# Patient Record
Sex: FEMALE | Race: WHITE | Employment: UNEMPLOYED | URBAN - METROPOLITAN AREA
[De-identification: names, ages, dates, MRNs, and addresses within clinical notes are randomized per-mention and may not be internally consistent; named-entity substitution may affect disease eponyms.]

---

## 2017-01-25 NOTE — TELEPHONE ENCOUNTER
Please advise on Amitriptylline Rx refill. Thank you.  LR 2/22/16 LOV 6/27/16    Rx pended as previously prescribed #90 with 3 refills for MD approval.

## 2017-01-25 NOTE — TELEPHONE ENCOUNTER
From: Sulma Borrego  To:  Gil Leach DO  Sent: 1/22/2017 2:28 PM CST  Subject: Medication Renewal Request    Original authorizing provider: DO Sulma Castro would like a refill of the following medications:  Amitriptyline HCl (INDERJIT

## 2017-01-27 RX ORDER — AMITRIPTYLINE HYDROCHLORIDE 50 MG/1
50 TABLET, FILM COATED ORAL NIGHTLY
Qty: 90 TABLET | Refills: 0 | Status: SHIPPED | OUTPATIENT
Start: 2017-01-27 | End: 2017-03-14

## 2017-02-21 ENCOUNTER — OFFICE VISIT (OUTPATIENT)
Dept: OTOLARYNGOLOGY | Facility: CLINIC | Age: 52
End: 2017-02-21

## 2017-02-21 VITALS
BODY MASS INDEX: 26.68 KG/M2 | DIASTOLIC BLOOD PRESSURE: 81 MMHG | TEMPERATURE: 99 F | WEIGHT: 170 LBS | RESPIRATION RATE: 18 BRPM | HEIGHT: 67 IN | SYSTOLIC BLOOD PRESSURE: 114 MMHG | HEART RATE: 92 BPM

## 2017-02-21 DIAGNOSIS — H72.91 PERFORATED TYMPANIC MEMBRANE ON EXAMINATION, RIGHT: Primary | ICD-10-CM

## 2017-02-21 DIAGNOSIS — H92.11 OTORRHEA OF RIGHT EAR: ICD-10-CM

## 2017-02-21 PROCEDURE — 99213 OFFICE O/P EST LOW 20 MIN: CPT | Performed by: OTOLARYNGOLOGY

## 2017-02-21 PROCEDURE — 99212 OFFICE O/P EST SF 10 MIN: CPT | Performed by: OTOLARYNGOLOGY

## 2017-02-21 PROCEDURE — 92504 EAR MICROSCOPY EXAMINATION: CPT | Performed by: OTOLARYNGOLOGY

## 2017-02-21 RX ORDER — CIPROFLOXACIN AND DEXAMETHASONE 3; 1 MG/ML; MG/ML
3 SUSPENSION/ DROPS AURICULAR (OTIC) EVERY 12 HOURS
Qty: 1 BOTTLE | Refills: 0 | Status: SHIPPED | OUTPATIENT
Start: 2017-02-21 | End: 2017-02-28

## 2017-02-21 NOTE — PROGRESS NOTES
Norberto Shea is a 46year old female. Patient presents with:  Cerumen Impaction: Patient c/o bilateral ear wax and requesting cleaning.  Pt c/o hearing loss to right ear and refusing hearing test.      HISTORY OF PRESENT ILLNESS    Known history of perfor Negative Tremors. Psych Negative Anxiety and depression. Integumentary Negative Frequent skin infections, pigment change and rash. Hema/Lymph Negative Easy bleeding and easy bruising. PHYSICAL EXAM    /81 mmHg  Pulse 92  Temp(Src) 98. Otic Suspension, Place 3 drops into the right ear every 12 (twelve) hours. , Disp: 1 Bottle, Rfl: 0  •  Amitriptyline HCl 50 MG Oral Tab, Take 1 tablet (50 mg total) by mouth nightly., Disp: 90 tablet, Rfl: 0  •  AmLODIPine Besylate (NORVASC) 5 MG Oral Tab,

## 2017-02-28 RX ORDER — AMLODIPINE BESYLATE 5 MG/1
5 TABLET ORAL DAILY
Qty: 90 TABLET | Refills: 3 | OUTPATIENT
Start: 2017-02-28

## 2017-02-28 NOTE — TELEPHONE ENCOUNTER
From: Yang Jaquez  To: Lili Atkins DO  Sent: 2/22/2017 3:39 PM CST  Subject: Medication Renewal Request    Original authorizing provider: DO Yang Jean would like a refill of the following medications:   AmLODIPine Besylate (N

## 2017-03-01 RX ORDER — AMLODIPINE BESYLATE 5 MG/1
5 TABLET ORAL DAILY
Qty: 90 TABLET | Refills: 0 | Status: SHIPPED | OUTPATIENT
Start: 2017-03-01 | End: 2017-03-14

## 2017-03-01 NOTE — TELEPHONE ENCOUNTER
Pt does not meet criteria as she has not had ov in the last 6 months or next 3 months. LOV 6/27/2016 with no RTC. Pt advised and scheduled 3/14. Meets criteria, sent 90/0.

## 2017-03-01 NOTE — TELEPHONE ENCOUNTER
Hypertensive Medications  Protocol Criteria:  · Appointment scheduled in the past 6 months or in the next 3 months  · BMP or CMP in the past 12 months  · Creatinine result < 2  Recent Visits       Provider Department Primary Dx    8 months ago Kaveh Hernandez

## 2017-03-14 ENCOUNTER — OFFICE VISIT (OUTPATIENT)
Dept: FAMILY MEDICINE CLINIC | Facility: CLINIC | Age: 52
End: 2017-03-14

## 2017-03-14 VITALS
SYSTOLIC BLOOD PRESSURE: 121 MMHG | DIASTOLIC BLOOD PRESSURE: 86 MMHG | HEART RATE: 81 BPM | WEIGHT: 175 LBS | BODY MASS INDEX: 27.47 KG/M2 | TEMPERATURE: 98 F | HEIGHT: 67 IN

## 2017-03-14 DIAGNOSIS — I10 ESSENTIAL HYPERTENSION: ICD-10-CM

## 2017-03-14 DIAGNOSIS — Z00.00 ROUTINE GENERAL MEDICAL EXAMINATION AT A HEALTH CARE FACILITY: Primary | ICD-10-CM

## 2017-03-14 PROCEDURE — 99396 PREV VISIT EST AGE 40-64: CPT | Performed by: FAMILY MEDICINE

## 2017-03-14 RX ORDER — AMITRIPTYLINE HYDROCHLORIDE 50 MG/1
50 TABLET, FILM COATED ORAL NIGHTLY
Qty: 90 TABLET | Refills: 3 | Status: SHIPPED | OUTPATIENT
Start: 2017-03-14 | End: 2018-04-09

## 2017-03-14 RX ORDER — METOPROLOL SUCCINATE 50 MG/1
50 TABLET, EXTENDED RELEASE ORAL DAILY
Qty: 90 TABLET | Refills: 3 | Status: SHIPPED | OUTPATIENT
Start: 2017-03-14 | End: 2018-04-09

## 2017-03-14 RX ORDER — AMLODIPINE BESYLATE 5 MG/1
5 TABLET ORAL DAILY
Qty: 90 TABLET | Refills: 3 | Status: SHIPPED | OUTPATIENT
Start: 2017-03-14 | End: 2018-04-09

## 2017-03-14 NOTE — PROGRESS NOTES
HPI:    Patient ID: Morro Benton is a 46year old female. HPI  Patient presents with:  Routine Physical    Review of Systems   Constitutional: Negative. HENT: Negative. Eyes: Negative. Respiratory: Negative. Cardiovascular: Negative.     Gas sounds normal.   Abdominal: Normal appearance. There is no tenderness. Musculoskeletal:        Lumbar back: Normal.   Lymphadenopathy:        Right: No supraclavicular adenopathy present. Left: No supraclavicular adenopathy present.    Neurological

## 2017-06-06 ENCOUNTER — OFFICE VISIT (OUTPATIENT)
Dept: OTOLARYNGOLOGY | Facility: CLINIC | Age: 52
End: 2017-06-06

## 2017-06-06 VITALS
DIASTOLIC BLOOD PRESSURE: 78 MMHG | SYSTOLIC BLOOD PRESSURE: 122 MMHG | BODY MASS INDEX: 26.68 KG/M2 | WEIGHT: 170 LBS | HEIGHT: 67 IN | TEMPERATURE: 98 F

## 2017-06-06 DIAGNOSIS — H61.23 BILATERAL IMPACTED CERUMEN: Primary | ICD-10-CM

## 2017-06-06 PROCEDURE — 69210 REMOVE IMPACTED EAR WAX UNI: CPT | Performed by: OTOLARYNGOLOGY

## 2017-06-06 NOTE — PROGRESS NOTES
Isha Smith is a 45year old female.   Patient presents with:  Ear Wax: bilateral ear cleaning       HISTORY OF PRESENT ILLNESS    Patient presents for cerumen removal. No other complaints or concerns at this time    Social History    Marital Status: [employer] Fast 170 lb (77.111 kg)  BMI 26.62 kg/m2       Constitutional Normal Overall appearance - Normal.        Neck Exam Normal Inspection - Normal. Palpation - Normal. Parotid gland - Normal. Thyroid gland - Normal.             Head/Face Normal Facial features - Nor

## 2017-07-11 ENCOUNTER — OFFICE VISIT (OUTPATIENT)
Dept: OTOLARYNGOLOGY | Facility: CLINIC | Age: 52
End: 2017-07-11

## 2017-07-11 VITALS
BODY MASS INDEX: 26.68 KG/M2 | HEIGHT: 67 IN | WEIGHT: 170 LBS | DIASTOLIC BLOOD PRESSURE: 62 MMHG | SYSTOLIC BLOOD PRESSURE: 112 MMHG | TEMPERATURE: 98 F

## 2017-07-11 DIAGNOSIS — H60.391 OTHER INFECTIVE ACUTE OTITIS EXTERNA OF RIGHT EAR: Primary | ICD-10-CM

## 2017-07-11 PROCEDURE — 99214 OFFICE O/P EST MOD 30 MIN: CPT | Performed by: OTOLARYNGOLOGY

## 2017-07-11 PROCEDURE — 99212 OFFICE O/P EST SF 10 MIN: CPT | Performed by: OTOLARYNGOLOGY

## 2017-07-11 RX ORDER — CIPROFLOXACIN AND DEXAMETHASONE 3; 1 MG/ML; MG/ML
3 SUSPENSION/ DROPS AURICULAR (OTIC) EVERY 12 HOURS
Qty: 1 BOTTLE | Refills: 0 | Status: SHIPPED | OUTPATIENT
Start: 2017-07-11 | End: 2017-07-16

## 2017-07-11 NOTE — PROGRESS NOTES
Carlos Johnson is a 45year old female. Patient presents with:  Ear Pain: patient states she has had the pain for about a week       HISTORY OF PRESENT ILLNESS    I have previously seen her for routine ear cleanings.   She presents today with complaints reg intolerance. Neuro Negative Tremors. Psych Negative Anxiety and depression. Integumentary Negative Frequent skin infections, pigment change and rash. Hema/Lymph Negative Easy bleeding and easy bruising.            PHYSICAL EXAM    /62 (BP Loca Rfl: 3  •  AmLODIPine Besylate 5 MG Oral Tab, Take 1 tablet (5 mg total) by mouth daily. , Disp: 90 tablet, Rfl: 3  •  Metoprolol Succinate ER (TOPROL XL) 50 MG Oral Tablet 24 Hr, Take 1 tablet (50 mg total) by mouth daily. , Disp: 90 tablet, Rfl: 3  •  Napr

## 2017-08-17 ENCOUNTER — APPOINTMENT (OUTPATIENT)
Dept: OTHER | Facility: HOSPITAL | Age: 52
End: 2017-08-17
Attending: ORTHOPAEDIC SURGERY

## 2017-09-24 ENCOUNTER — CHARTING TRANS (OUTPATIENT)
Dept: OTHER | Age: 52
End: 2017-09-24

## 2017-09-24 ENCOUNTER — LAB SERVICES (OUTPATIENT)
Dept: OTHER | Age: 52
End: 2017-09-24

## 2017-09-24 LAB — RAPID STREP GROUP A: NORMAL

## 2017-10-26 ENCOUNTER — OFFICE VISIT (OUTPATIENT)
Dept: OTOLARYNGOLOGY | Facility: CLINIC | Age: 52
End: 2017-10-26

## 2017-10-26 VITALS
HEIGHT: 67 IN | TEMPERATURE: 97 F | BODY MASS INDEX: 26.68 KG/M2 | DIASTOLIC BLOOD PRESSURE: 60 MMHG | WEIGHT: 170 LBS | SYSTOLIC BLOOD PRESSURE: 132 MMHG

## 2017-10-26 DIAGNOSIS — H60.391 OTHER INFECTIVE ACUTE OTITIS EXTERNA OF RIGHT EAR: Primary | ICD-10-CM

## 2017-10-26 PROCEDURE — 99212 OFFICE O/P EST SF 10 MIN: CPT | Performed by: OTOLARYNGOLOGY

## 2017-10-26 PROCEDURE — 92504 EAR MICROSCOPY EXAMINATION: CPT | Performed by: OTOLARYNGOLOGY

## 2017-10-26 PROCEDURE — 99214 OFFICE O/P EST MOD 30 MIN: CPT | Performed by: OTOLARYNGOLOGY

## 2017-10-26 RX ORDER — CIPROFLOXACIN AND DEXAMETHASONE 3; 1 MG/ML; MG/ML
3 SUSPENSION/ DROPS AURICULAR (OTIC) EVERY 12 HOURS
Qty: 1 BOTTLE | Refills: 0 | Status: SHIPPED | OUTPATIENT
Start: 2017-10-26 | End: 2017-11-02

## 2017-10-27 NOTE — PROGRESS NOTES
Anselmo Keith is a 45year old female. Patient presents with:  Ear Problem: cleaning, right ear drainage for few days       HISTORY OF PRESENT ILLNESS     I have previously seen her for routine ear cleanings.   She presents today with complaints regarding ENMT Negative Drooling. Eyes Negative Blurred vision and vision changes. Respiratory Negative Dyspnea and wheezing. Cardio Negative Chest pain, irregular heartbeat/palpitations and syncope. GI Negative Abdominal pain and diarrhea.    Endocrine Neg Microscopy  Binocular microscopy was performed. The affected ear(s) was/were examined and all debris removed using suction. The findings are described in the physical exam.   All debris was removed from the right ear canal using suction and microscopy.

## 2017-12-22 ENCOUNTER — MED REC SCAN ONLY (OUTPATIENT)
Dept: FAMILY MEDICINE CLINIC | Facility: CLINIC | Age: 52
End: 2017-12-22

## 2018-01-18 ENCOUNTER — OFFICE VISIT (OUTPATIENT)
Dept: OTOLARYNGOLOGY | Facility: CLINIC | Age: 53
End: 2018-01-18

## 2018-01-18 VITALS
DIASTOLIC BLOOD PRESSURE: 80 MMHG | BODY MASS INDEX: 26.68 KG/M2 | HEIGHT: 67 IN | SYSTOLIC BLOOD PRESSURE: 114 MMHG | TEMPERATURE: 99 F | WEIGHT: 170 LBS

## 2018-01-18 DIAGNOSIS — H60.392 OTHER INFECTIVE ACUTE OTITIS EXTERNA OF LEFT EAR: Primary | ICD-10-CM

## 2018-01-18 PROCEDURE — 99212 OFFICE O/P EST SF 10 MIN: CPT | Performed by: OTOLARYNGOLOGY

## 2018-01-18 PROCEDURE — 92504 EAR MICROSCOPY EXAMINATION: CPT | Performed by: OTOLARYNGOLOGY

## 2018-01-18 PROCEDURE — 99214 OFFICE O/P EST MOD 30 MIN: CPT | Performed by: OTOLARYNGOLOGY

## 2018-01-18 RX ORDER — LORAZEPAM 0.5 MG/1
TABLET ORAL
Refills: 2 | COMMUNITY
Start: 2017-11-02 | End: 2021-05-18 | Stop reason: ALTCHOICE

## 2018-01-18 NOTE — PROGRESS NOTES
Dhruv Mcgarry is a 45year old female.   Patient presents with:  Ear Problem: plugged left ear for 4 days with clear drainage      HISTORY OF PRESENT ILLNESS  I have previously seen her for routine ear cleanings.  She presents today with complaints regardin BREAST BIOPSY      Comment: breast lump   No date:       Comment: x2  No date: COLONOSCOPY  2009: Franck Jeffrey IUD  No date: D & C      Comment: x2  No date: OTHER SURGICAL HISTORY      Comment: ear surgery  No date: TONSILLECTOMY      REVIEW OF SYS Lymph Detail Normal Submental. Submandibular. Anterior cervical. Posterior cervical. Supraclavicular.         Nose/Mouth/Throat Normal External nose - Normal. Lips/teeth/gums - Normal. Tonsils - Normal. Oropharynx - Normal.   Nose/Mouth/Throat Normal

## 2018-01-19 ENCOUNTER — TELEPHONE (OUTPATIENT)
Dept: OTOLARYNGOLOGY | Facility: CLINIC | Age: 53
End: 2018-01-19

## 2018-01-19 RX ORDER — CIPROFLOXACIN 500 MG/1
500 TABLET, FILM COATED ORAL 2 TIMES DAILY
Qty: 14 TABLET | Refills: 0 | Status: SHIPPED | OUTPATIENT
Start: 2018-01-19 | End: 2018-01-26

## 2018-01-19 RX ORDER — CIPROFLOXACIN AND DEXAMETHASONE 3; 1 MG/ML; MG/ML
3 SUSPENSION/ DROPS AURICULAR (OTIC) 3 TIMES DAILY
Qty: 1 BOTTLE | Refills: 0 | Status: SHIPPED | OUTPATIENT
Start: 2018-01-19 | End: 2018-01-26

## 2018-01-19 NOTE — TELEPHONE ENCOUNTER
Per verbal from Dr. Nguyen Cardoso send in Rx for Cipro 500 mg BID for 7 days and Cirpodex 3 drops TID left ear for 7 days. Left detailed message stating rx sent to pharmacy.

## 2018-01-19 NOTE — TELEPHONE ENCOUNTER
Pt upset rx has not been called into pharmacy, AGNIESZKA was informed by RN Sarah Melendez is out of the office and will be speaking to one of the doctors in office and rx will be sent today, msg relayed to pt.

## 2018-01-19 NOTE — TELEPHONE ENCOUNTER
appt 1-18-18. Pt called stating pt's pharm. Has not received the rx. Only need the oral antibiotic. Please send.   Call

## 2018-04-09 ENCOUNTER — OFFICE VISIT (OUTPATIENT)
Dept: FAMILY MEDICINE CLINIC | Facility: CLINIC | Age: 53
End: 2018-04-09

## 2018-04-09 VITALS
HEART RATE: 80 BPM | HEIGHT: 67 IN | SYSTOLIC BLOOD PRESSURE: 120 MMHG | WEIGHT: 170 LBS | TEMPERATURE: 98 F | BODY MASS INDEX: 26.68 KG/M2 | DIASTOLIC BLOOD PRESSURE: 81 MMHG

## 2018-04-09 DIAGNOSIS — G43.009 MIGRAINE WITHOUT AURA AND WITHOUT STATUS MIGRAINOSUS, NOT INTRACTABLE: ICD-10-CM

## 2018-04-09 DIAGNOSIS — I10 ESSENTIAL HYPERTENSION: ICD-10-CM

## 2018-04-09 DIAGNOSIS — Z00.00 ROUTINE GENERAL MEDICAL EXAMINATION AT A HEALTH CARE FACILITY: Primary | ICD-10-CM

## 2018-04-09 PROCEDURE — 99396 PREV VISIT EST AGE 40-64: CPT | Performed by: FAMILY MEDICINE

## 2018-04-09 RX ORDER — AMITRIPTYLINE HYDROCHLORIDE 50 MG/1
50 TABLET, FILM COATED ORAL NIGHTLY
Qty: 90 TABLET | Refills: 3 | Status: SHIPPED | OUTPATIENT
Start: 2018-04-09 | End: 2019-04-15 | Stop reason: ALTCHOICE

## 2018-04-09 RX ORDER — METOPROLOL SUCCINATE 50 MG/1
50 TABLET, EXTENDED RELEASE ORAL DAILY
Qty: 90 TABLET | Refills: 3 | Status: SHIPPED | OUTPATIENT
Start: 2018-04-09 | End: 2019-04-15

## 2018-04-09 RX ORDER — AMLODIPINE BESYLATE 5 MG/1
5 TABLET ORAL DAILY
Qty: 90 TABLET | Refills: 3 | Status: SHIPPED | OUTPATIENT
Start: 2018-04-09 | End: 2019-04-15

## 2018-04-09 NOTE — PROGRESS NOTES
HPI:    Patient ID: Michael Antunez is a 52year old female.     HPI  Patient presents with:  Routine Physical  Cough: c/o cough with scratchy throat for 3 days    Past Medical History:   Diagnosis Date   • Diverticulitis 4/2009    medication   • Migraines AV nicking and no hemorrhage. The left eye shows no AV nicking and no hemorrhage. Neck: Normal range of motion. Neck supple. No thyroid mass and no thyromegaly present. Cardiovascular: Normal heart sounds.     Pulmonary/Chest: Breath sounds jeremiah

## 2018-04-16 ENCOUNTER — TELEPHONE (OUTPATIENT)
Dept: OTHER | Age: 53
End: 2018-04-16

## 2018-04-16 RX ORDER — AZITHROMYCIN 250 MG/1
TABLET, FILM COATED ORAL
Qty: 6 TABLET | Refills: 0 | Status: SHIPPED | OUTPATIENT
Start: 2018-04-16 | End: 2019-04-15 | Stop reason: ALTCHOICE

## 2018-04-16 NOTE — TELEPHONE ENCOUNTER
Pt LOV 4/9/18 ans states symptoms worsening currently afebrile, productive yellow sputum cough,  Sinus drainage yellow green. Denies ear pain but right ear feels congested and plugged up    Requesting abx to pharmacy on file.

## 2018-07-12 ENCOUNTER — LAB ENCOUNTER (OUTPATIENT)
Dept: LAB | Facility: HOSPITAL | Age: 53
End: 2018-07-12
Attending: FAMILY MEDICINE
Payer: COMMERCIAL

## 2018-07-12 DIAGNOSIS — Z00.00 ROUTINE GENERAL MEDICAL EXAMINATION AT A HEALTH CARE FACILITY: ICD-10-CM

## 2018-07-12 LAB
ALBUMIN SERPL BCP-MCNC: 3.9 G/DL (ref 3.5–4.8)
ALBUMIN/GLOB SERPL: 1.4 {RATIO} (ref 1–2)
ALP SERPL-CCNC: 81 U/L (ref 32–100)
ALT SERPL-CCNC: 23 U/L (ref 14–54)
ANION GAP SERPL CALC-SCNC: 6 MMOL/L (ref 0–18)
AST SERPL-CCNC: 23 U/L (ref 15–41)
BASOPHILS # BLD: 0 K/UL (ref 0–0.2)
BASOPHILS NFR BLD: 1 %
BILIRUB SERPL-MCNC: 0.8 MG/DL (ref 0.3–1.2)
BUN SERPL-MCNC: 12 MG/DL (ref 8–20)
BUN/CREAT SERPL: 15.8 (ref 10–20)
CALCIUM SERPL-MCNC: 9.4 MG/DL (ref 8.5–10.5)
CHLORIDE SERPL-SCNC: 106 MMOL/L (ref 95–110)
CHOLEST SERPL-MCNC: 204 MG/DL (ref 110–200)
CO2 SERPL-SCNC: 27 MMOL/L (ref 22–32)
CREAT SERPL-MCNC: 0.76 MG/DL (ref 0.5–1.5)
EOSINOPHIL # BLD: 0.3 K/UL (ref 0–0.7)
EOSINOPHIL NFR BLD: 4 %
ERYTHROCYTE [DISTWIDTH] IN BLOOD BY AUTOMATED COUNT: 13.4 % (ref 11–15)
GLOBULIN PLAS-MCNC: 2.8 G/DL (ref 2.5–3.7)
GLUCOSE SERPL-MCNC: 91 MG/DL (ref 70–99)
HCT VFR BLD AUTO: 41.3 % (ref 35–48)
HDLC SERPL-MCNC: 55 MG/DL
HGB BLD-MCNC: 13.4 G/DL (ref 12–16)
LDLC SERPL CALC-MCNC: 133 MG/DL (ref 0–99)
LYMPHOCYTES # BLD: 1.9 K/UL (ref 1–4)
LYMPHOCYTES NFR BLD: 30 %
MCH RBC QN AUTO: 26.6 PG (ref 27–32)
MCHC RBC AUTO-ENTMCNC: 32.5 G/DL (ref 32–37)
MCV RBC AUTO: 81.8 FL (ref 80–100)
MONOCYTES # BLD: 0.5 K/UL (ref 0–1)
MONOCYTES NFR BLD: 9 %
NEUTROPHILS # BLD AUTO: 3.5 K/UL (ref 1.8–7.7)
NEUTROPHILS NFR BLD: 56 %
NONHDLC SERPL-MCNC: 149 MG/DL
OSMOLALITY UR CALC.SUM OF ELEC: 287 MOSM/KG (ref 275–295)
PATIENT FASTING: YES
PLATELET # BLD AUTO: 251 K/UL (ref 140–400)
PMV BLD AUTO: 9.4 FL (ref 7.4–10.3)
POTASSIUM SERPL-SCNC: 4.5 MMOL/L (ref 3.3–5.1)
PROT SERPL-MCNC: 6.7 G/DL (ref 5.9–8.4)
RBC # BLD AUTO: 5.05 M/UL (ref 3.7–5.4)
SODIUM SERPL-SCNC: 139 MMOL/L (ref 136–144)
TRIGL SERPL-MCNC: 78 MG/DL (ref 1–149)
WBC # BLD AUTO: 6.3 K/UL (ref 4–11)

## 2018-07-12 PROCEDURE — 80061 LIPID PANEL: CPT

## 2018-07-12 PROCEDURE — 80053 COMPREHEN METABOLIC PANEL: CPT

## 2018-07-12 PROCEDURE — 36415 COLL VENOUS BLD VENIPUNCTURE: CPT

## 2018-07-12 PROCEDURE — 85025 COMPLETE CBC W/AUTO DIFF WBC: CPT

## 2018-07-12 PROCEDURE — 82306 VITAMIN D 25 HYDROXY: CPT

## 2018-07-13 LAB — 25(OH)D3 SERPL-MCNC: 42.1 NG/ML

## 2018-11-02 VITALS
OXYGEN SATURATION: 99 % | BODY MASS INDEX: 26.73 KG/M2 | RESPIRATION RATE: 20 BRPM | SYSTOLIC BLOOD PRESSURE: 120 MMHG | HEIGHT: 67 IN | WEIGHT: 170.3 LBS | DIASTOLIC BLOOD PRESSURE: 88 MMHG | TEMPERATURE: 99.9 F | HEART RATE: 80 BPM

## 2019-04-15 ENCOUNTER — OFFICE VISIT (OUTPATIENT)
Dept: FAMILY MEDICINE CLINIC | Facility: CLINIC | Age: 54
End: 2019-04-15
Payer: COMMERCIAL

## 2019-04-15 VITALS
SYSTOLIC BLOOD PRESSURE: 115 MMHG | HEIGHT: 66.9 IN | TEMPERATURE: 99 F | BODY MASS INDEX: 27.31 KG/M2 | DIASTOLIC BLOOD PRESSURE: 78 MMHG | HEART RATE: 96 BPM | WEIGHT: 174 LBS

## 2019-04-15 DIAGNOSIS — Z00.00 ROUTINE GENERAL MEDICAL EXAMINATION AT A HEALTH CARE FACILITY: Primary | ICD-10-CM

## 2019-04-15 DIAGNOSIS — G43.009 MIGRAINE WITHOUT AURA AND WITHOUT STATUS MIGRAINOSUS, NOT INTRACTABLE: ICD-10-CM

## 2019-04-15 DIAGNOSIS — I10 ESSENTIAL HYPERTENSION: ICD-10-CM

## 2019-04-15 PROCEDURE — 99396 PREV VISIT EST AGE 40-64: CPT | Performed by: FAMILY MEDICINE

## 2019-04-15 RX ORDER — AMITRIPTYLINE HYDROCHLORIDE 50 MG/1
50 TABLET, FILM COATED ORAL NIGHTLY
Qty: 90 TABLET | Refills: 3 | Status: SHIPPED | OUTPATIENT
Start: 2019-04-15 | End: 2020-03-06

## 2019-04-15 RX ORDER — AMLODIPINE BESYLATE 5 MG/1
5 TABLET ORAL DAILY
Qty: 90 TABLET | Refills: 3 | Status: SHIPPED | OUTPATIENT
Start: 2019-04-15 | End: 2020-04-13

## 2019-04-15 RX ORDER — METOPROLOL SUCCINATE 50 MG/1
50 TABLET, EXTENDED RELEASE ORAL DAILY
Qty: 90 TABLET | Refills: 3 | Status: SHIPPED | OUTPATIENT
Start: 2019-04-15 | End: 2020-03-27

## 2019-04-15 NOTE — PROGRESS NOTES
HPI:    Patient ID: Jermaine Lipscomb is a 62year old female. HPI  Patient presents with:  Routine Physical  Hypertension: f/u medication and refills    Review of Systems   Constitutional: Negative. HENT: Negative. Eyes: Negative.     Respiratory: Neg venous pulsations. Neck: Neck supple. No thyroid mass and no thyromegaly present. Cardiovascular: Normal heart sounds. Pulses:       Radial pulses are 2+ on the right side, and 2+ on the left side.    Pulmonary/Chest: Effort normal and breath sounds n

## 2019-05-20 RX ORDER — AMLODIPINE BESYLATE 5 MG/1
TABLET ORAL
Qty: 90 TABLET | Refills: 0 | OUTPATIENT
Start: 2019-05-20

## 2019-06-06 ENCOUNTER — OFFICE VISIT (OUTPATIENT)
Dept: DERMATOLOGY CLINIC | Facility: CLINIC | Age: 54
End: 2019-06-06
Payer: COMMERCIAL

## 2019-06-06 DIAGNOSIS — D23.30 BENIGN NEOPLASM OF SKIN OF FACE: ICD-10-CM

## 2019-06-06 DIAGNOSIS — L30.9 DERMATITIS: Primary | ICD-10-CM

## 2019-06-06 PROCEDURE — 99212 OFFICE O/P EST SF 10 MIN: CPT | Performed by: DERMATOLOGY

## 2019-06-06 PROCEDURE — 99202 OFFICE O/P NEW SF 15 MIN: CPT | Performed by: DERMATOLOGY

## 2019-06-06 RX ORDER — MELATONIN 10 MG
CAPSULE ORAL
COMMUNITY
Start: 2016-01-01

## 2019-06-06 NOTE — PROGRESS NOTES
HPI:     Chief Complaint     Lesion        HPI     Lesion      Additional comments: New pt presenting with lesion to L upper lip and plam of L hand. Pt denies personal and family hx of skin cancer.            Last edited by Kayden Younger LPN on 3/6/3692 [Amoclan]    NAUSEA AND VOMITING  Sulfa Antibiotics       NAUSEA AND VOMITING  Sulfamethoxazole        NAUSEA AND VOMITING  Trimethoprim            UNKNOWN    Past Medical History:   Diagnosis Date   • Diverticulitis 4/2009    medication   • Migraines  Service: Not Asked        Blood Transfusions: Not Asked        Caffeine Concern: Yes          2 cups of coffee         Occupational Exposure: Not Asked        Hobby Hazards: Not Asked        Sleep Concern: Not Asked        Stress Concern: Not this or any previous visit (from the past 48 hour(s)). Meds This Visit:      Imaging Orders:  None     Referral Orders:  No orders of the defined types were placed in this encounter.         6/6/2019  Prudence Novak

## 2019-07-30 ENCOUNTER — OFFICE VISIT (OUTPATIENT)
Dept: OTOLARYNGOLOGY | Facility: CLINIC | Age: 54
End: 2019-07-30
Payer: COMMERCIAL

## 2019-07-30 VITALS
SYSTOLIC BLOOD PRESSURE: 114 MMHG | HEIGHT: 67 IN | TEMPERATURE: 98 F | DIASTOLIC BLOOD PRESSURE: 73 MMHG | BODY MASS INDEX: 27.31 KG/M2 | WEIGHT: 174 LBS

## 2019-07-30 DIAGNOSIS — H60.391 OTHER INFECTIVE ACUTE OTITIS EXTERNA OF RIGHT EAR: Primary | ICD-10-CM

## 2019-07-30 PROCEDURE — 92504 EAR MICROSCOPY EXAMINATION: CPT | Performed by: OTOLARYNGOLOGY

## 2019-07-30 PROCEDURE — 99214 OFFICE O/P EST MOD 30 MIN: CPT | Performed by: OTOLARYNGOLOGY

## 2019-07-30 RX ORDER — OFLOXACIN 3 MG/ML
3 SOLUTION/ DROPS OPHTHALMIC 3 TIMES DAILY
Qty: 1 BOTTLE | Refills: 0 | Status: SHIPPED | OUTPATIENT
Start: 2019-07-30 | End: 2019-11-06

## 2019-07-30 NOTE — PROGRESS NOTES
Malik Milton is a 62year old female.   Patient presents with:  Ear Problem: c/o clogged right ear with clear drainage for 1.5 weeks      HISTORY OF PRESENT ILLNESS  I have previously seen her for routine ear cleanings.  She presents today with complaints esophageal (cause of death)   • Hypertension Mother        Past Medical History:   Diagnosis Date   • Diverticulitis 4/2009    medication   • Migraines        Past Surgical History:   Procedure Laterality Date   • BREAST BIOPSY  2010    breast lump    • C- Right: Perforated tympanic membrane with infection left: Normal.   Skin Normal Inspection - Normal.        Lymph Detail Normal Submental. Submandibular. Anterior cervical. Posterior cervical. Supraclavicular.         Nose/Mouth/Throat Normal External nose -

## 2019-09-03 ENCOUNTER — LAB ENCOUNTER (OUTPATIENT)
Dept: LAB | Facility: HOSPITAL | Age: 54
End: 2019-09-03
Attending: FAMILY MEDICINE
Payer: COMMERCIAL

## 2019-09-03 DIAGNOSIS — Z00.00 ROUTINE GENERAL MEDICAL EXAMINATION AT A HEALTH CARE FACILITY: ICD-10-CM

## 2019-09-03 LAB
25(OH)D3 SERPL-MCNC: 38.2 NG/ML (ref 30–100)
ALBUMIN SERPL-MCNC: 3.9 G/DL (ref 3.4–5)
ALBUMIN/GLOB SERPL: 1.2 {RATIO} (ref 1–2)
ALP LIVER SERPL-CCNC: 117 U/L (ref 46–118)
ALT SERPL-CCNC: 57 U/L (ref 13–56)
ANION GAP SERPL CALC-SCNC: 5 MMOL/L (ref 0–18)
AST SERPL-CCNC: 27 U/L (ref 15–37)
BASOPHILS # BLD AUTO: 0.04 X10(3) UL (ref 0–0.2)
BASOPHILS NFR BLD AUTO: 1 %
BILIRUB SERPL-MCNC: 0.5 MG/DL (ref 0.1–2)
BUN BLD-MCNC: 18 MG/DL (ref 7–18)
BUN/CREAT SERPL: 22.2 (ref 10–20)
CALCIUM BLD-MCNC: 9.3 MG/DL (ref 8.5–10.1)
CHLORIDE SERPL-SCNC: 109 MMOL/L (ref 98–112)
CHOLEST SMN-MCNC: 189 MG/DL (ref ?–200)
CO2 SERPL-SCNC: 28 MMOL/L (ref 21–32)
CREAT BLD-MCNC: 0.81 MG/DL (ref 0.55–1.02)
DEPRECATED RDW RBC AUTO: 41.4 FL (ref 35.1–46.3)
EOSINOPHIL # BLD AUTO: 0.18 X10(3) UL (ref 0–0.7)
EOSINOPHIL NFR BLD AUTO: 4.3 %
ERYTHROCYTE [DISTWIDTH] IN BLOOD BY AUTOMATED COUNT: 13.5 % (ref 11–15)
GLOBULIN PLAS-MCNC: 3.2 G/DL (ref 2.8–4.4)
GLUCOSE BLD-MCNC: 82 MG/DL (ref 70–99)
HCT VFR BLD AUTO: 40 % (ref 35–48)
HDLC SERPL-MCNC: 58 MG/DL (ref 40–59)
HGB BLD-MCNC: 12.7 G/DL (ref 12–16)
IMM GRANULOCYTES # BLD AUTO: 0.01 X10(3) UL (ref 0–1)
IMM GRANULOCYTES NFR BLD: 0.2 %
LDLC SERPL CALC-MCNC: 116 MG/DL (ref ?–100)
LYMPHOCYTES # BLD AUTO: 1.42 X10(3) UL (ref 1–4)
LYMPHOCYTES NFR BLD AUTO: 34.2 %
M PROTEIN MFR SERPL ELPH: 7.1 G/DL (ref 6.4–8.2)
MCH RBC QN AUTO: 26.6 PG (ref 26–34)
MCHC RBC AUTO-ENTMCNC: 31.8 G/DL (ref 31–37)
MCV RBC AUTO: 83.9 FL (ref 80–100)
MONOCYTES # BLD AUTO: 0.39 X10(3) UL (ref 0.1–1)
MONOCYTES NFR BLD AUTO: 9.4 %
NEUTROPHILS # BLD AUTO: 2.11 X10 (3) UL (ref 1.5–7.7)
NEUTROPHILS # BLD AUTO: 2.11 X10(3) UL (ref 1.5–7.7)
NEUTROPHILS NFR BLD AUTO: 50.9 %
NONHDLC SERPL-MCNC: 131 MG/DL (ref ?–130)
OSMOLALITY SERPL CALC.SUM OF ELEC: 295 MOSM/KG (ref 275–295)
PATIENT FASTING: YES
PATIENT FASTING: YES
PLATELET # BLD AUTO: 289 10(3)UL (ref 150–450)
POTASSIUM SERPL-SCNC: 4.3 MMOL/L (ref 3.5–5.1)
RBC # BLD AUTO: 4.77 X10(6)UL (ref 3.8–5.3)
SODIUM SERPL-SCNC: 142 MMOL/L (ref 136–145)
TRIGL SERPL-MCNC: 74 MG/DL (ref 30–149)
VLDLC SERPL CALC-MCNC: 15 MG/DL (ref 0–30)
WBC # BLD AUTO: 4.2 X10(3) UL (ref 4–11)

## 2019-09-03 PROCEDURE — 82306 VITAMIN D 25 HYDROXY: CPT

## 2019-09-03 PROCEDURE — 36415 COLL VENOUS BLD VENIPUNCTURE: CPT

## 2019-09-03 PROCEDURE — 80053 COMPREHEN METABOLIC PANEL: CPT

## 2019-09-03 PROCEDURE — 85025 COMPLETE CBC W/AUTO DIFF WBC: CPT

## 2019-09-03 PROCEDURE — 80061 LIPID PANEL: CPT

## 2019-11-05 ENCOUNTER — OFFICE VISIT (OUTPATIENT)
Dept: OTOLARYNGOLOGY | Facility: CLINIC | Age: 54
End: 2019-11-05
Payer: COMMERCIAL

## 2019-11-05 VITALS
BODY MASS INDEX: 25.9 KG/M2 | DIASTOLIC BLOOD PRESSURE: 80 MMHG | TEMPERATURE: 98 F | SYSTOLIC BLOOD PRESSURE: 115 MMHG | HEIGHT: 67 IN | WEIGHT: 165 LBS

## 2019-11-05 DIAGNOSIS — H60.392 OTHER INFECTIVE ACUTE OTITIS EXTERNA OF LEFT EAR: Primary | ICD-10-CM

## 2019-11-05 DIAGNOSIS — H72.91 PERFORATION OF RIGHT TYMPANIC MEMBRANE: ICD-10-CM

## 2019-11-05 PROCEDURE — 99214 OFFICE O/P EST MOD 30 MIN: CPT | Performed by: OTOLARYNGOLOGY

## 2019-11-05 PROCEDURE — 92504 EAR MICROSCOPY EXAMINATION: CPT | Performed by: OTOLARYNGOLOGY

## 2019-11-05 NOTE — PROGRESS NOTES
Tami Monique is a 62year old female.   Patient presents with:  Ear Problem: pt presents with drainage in left ear, feels clogged for 5-6 days, no pain       HISTORY OF PRESENT ILLNESS    I have previously seen her for routine ear cleanings.  She presents History      Marital status:       Spouse name: Not on file      Number of children: Not on file      Years of education: Not on file      Highest education level: Not on file    Tobacco Use      Smoking status: Former Smoker        Types: Cigarette to time, place, person & situation. Appropriate mood and affect.    Neck Exam Normal Inspection - Normal. Palpation - Normal. Parotid gland - Normal. Thyroid gland - Normal.   Eyes Normal Conjunctiva - Right: Normal, Left: Normal. Pupil - Right: Normal, Lef mouth daily. , Disp: 90 tablet, Rfl: 3  •  Amitriptyline HCl 50 MG Oral Tab, Take 1 tablet (50 mg total) by mouth nightly., Disp: 90 tablet, Rfl: 3  •  LORazepam 0.5 MG Oral Tab, , Disp: , Rfl: 2  •  Naproxen Sodium 550 MG Oral Tab, Take  by mouth., Disp: ,

## 2019-11-06 RX ORDER — OFLOXACIN 3 MG/ML
3 SOLUTION/ DROPS OPHTHALMIC 3 TIMES DAILY
Qty: 1 BOTTLE | Refills: 0 | Status: SHIPPED | OUTPATIENT
Start: 2019-11-06 | End: 2019-11-21 | Stop reason: ALTCHOICE

## 2019-11-21 ENCOUNTER — OFFICE VISIT (OUTPATIENT)
Dept: OTOLARYNGOLOGY | Facility: CLINIC | Age: 54
End: 2019-11-21
Payer: COMMERCIAL

## 2019-11-21 ENCOUNTER — TELEPHONE (OUTPATIENT)
Dept: OTOLARYNGOLOGY | Facility: CLINIC | Age: 54
End: 2019-11-21

## 2019-11-21 VITALS
DIASTOLIC BLOOD PRESSURE: 84 MMHG | HEIGHT: 67 IN | TEMPERATURE: 98 F | SYSTOLIC BLOOD PRESSURE: 117 MMHG | BODY MASS INDEX: 25.9 KG/M2 | WEIGHT: 165 LBS

## 2019-11-21 DIAGNOSIS — H72.91 PERFORATION OF RIGHT TYMPANIC MEMBRANE: ICD-10-CM

## 2019-11-21 DIAGNOSIS — H60.392 OTHER INFECTIVE ACUTE OTITIS EXTERNA OF LEFT EAR: Primary | ICD-10-CM

## 2019-11-21 PROCEDURE — 92504 EAR MICROSCOPY EXAMINATION: CPT | Performed by: OTOLARYNGOLOGY

## 2019-11-21 PROCEDURE — 99213 OFFICE O/P EST LOW 20 MIN: CPT | Performed by: OTOLARYNGOLOGY

## 2019-11-21 RX ORDER — TOBRAMYCIN AND DEXAMETHASONE 3; 1 MG/ML; MG/ML
3 SUSPENSION/ DROPS OPHTHALMIC EVERY 8 HOURS
Qty: 1 BOTTLE | Refills: 0 | Status: SHIPPED | OUTPATIENT
Start: 2019-11-21 | End: 2020-01-15

## 2019-11-21 RX ORDER — CIPROFLOXACIN 500 MG/1
500 TABLET, FILM COATED ORAL 2 TIMES DAILY
Qty: 14 TABLET | Refills: 0 | Status: SHIPPED | OUTPATIENT
Start: 2019-11-21 | End: 2019-11-28

## 2019-11-21 NOTE — PROGRESS NOTES
Ashley Cavazos is a 62year old female. Patient presents with:   Follow - Up: regarding acute otitis externa of left ear, no change in symptoms       HISTORY OF PRESENT ILLNESS  I have previously seen her for routine ear cleanings.  She presents today with ofloxacin drops and states that she feels there is still plugged and still not hearing as well. Some discomfort as well on that same side. Previously noted to have an old T-tube in place that she is had for about 35 years.   Had some drainage which was cl Neuro Negative Tremors. Psych Negative Anxiety and depression. Integumentary Negative Frequent skin infections, pigment change and rash. Hema/Lymph Negative Easy bleeding and easy bruising.            PHYSICAL EXAM    /84   Temp 98.4 °F (36.9 left    Current Outpatient Medications:   •  Melatonin 10 MG Oral Cap, , Disp: , Rfl:   •  amLODIPine Besylate 5 MG Oral Tab, Take 1 tablet (5 mg total) by mouth daily. , Disp: 90 tablet, Rfl: 3  •  Metoprolol Succinate ER (TOPROL XL) 50 MG Oral Tablet 24 H

## 2019-11-21 NOTE — TELEPHONE ENCOUNTER
[facility]. only Rx for Ear drops was received.

## 2019-12-05 ENCOUNTER — OFFICE VISIT (OUTPATIENT)
Dept: OTOLARYNGOLOGY | Facility: CLINIC | Age: 54
End: 2019-12-05
Payer: COMMERCIAL

## 2019-12-05 VITALS — BODY MASS INDEX: 25.9 KG/M2 | HEIGHT: 67 IN | WEIGHT: 165 LBS

## 2019-12-05 DIAGNOSIS — H72.91 PERFORATION OF RIGHT TYMPANIC MEMBRANE: ICD-10-CM

## 2019-12-05 DIAGNOSIS — H60.392 OTHER INFECTIVE ACUTE OTITIS EXTERNA OF LEFT EAR: Primary | ICD-10-CM

## 2019-12-05 PROCEDURE — 99213 OFFICE O/P EST LOW 20 MIN: CPT | Performed by: OTOLARYNGOLOGY

## 2019-12-05 NOTE — PROGRESS NOTES
Carmelita Mojica is a 62year old female.   Patient presents with:  Ear Problem: pt here for a 2 wk follow up:Perforation of right tympanic membrane, per pt left  ear is better, would like right ear to be checked       HISTORY OF PRESENT ILLNESS  I have previo the right ear.     11/21/19 last visit she was started on ofloxacin drops and states that she feels there is still plugged and still not hearing as well. Some discomfort as well on that same side.   Previously noted to have an old T-tube in place that she surgery   • TONSILLECTOMY           REVIEW OF SYSTEMS    System Neg/Pos Details   Constitutional Negative Fatigue, fever and weight loss. ENMT Negative Drooling. Eyes Negative Blurred vision and vision changes.    Respiratory Negative Dyspnea and wheezi Normal Left: Normal. Septum -Normal  Turbinates - Right: Normal, Left: Normal.       Current Outpatient Medications:   •  Melatonin 10 MG Oral Cap, , Disp: , Rfl:   •  triamcinolone acetonide 0.1 % External Ointment, Apply to affected area 1-2x/day as need

## 2020-01-09 ENCOUNTER — OFFICE VISIT (OUTPATIENT)
Dept: OTOLARYNGOLOGY | Facility: CLINIC | Age: 55
End: 2020-01-09
Payer: COMMERCIAL

## 2020-01-09 VITALS
BODY MASS INDEX: 25.9 KG/M2 | HEIGHT: 67 IN | TEMPERATURE: 98 F | DIASTOLIC BLOOD PRESSURE: 78 MMHG | WEIGHT: 165 LBS | SYSTOLIC BLOOD PRESSURE: 121 MMHG

## 2020-01-09 DIAGNOSIS — H60.391 OTHER INFECTIVE ACUTE OTITIS EXTERNA OF RIGHT EAR: Primary | ICD-10-CM

## 2020-01-09 PROCEDURE — 99214 OFFICE O/P EST MOD 30 MIN: CPT | Performed by: OTOLARYNGOLOGY

## 2020-01-09 PROCEDURE — 92504 EAR MICROSCOPY EXAMINATION: CPT | Performed by: OTOLARYNGOLOGY

## 2020-01-09 RX ORDER — CIPROFLOXACIN 500 MG/1
500 TABLET, FILM COATED ORAL EVERY 12 HOURS
Qty: 14 TABLET | Refills: 0 | Status: SHIPPED | OUTPATIENT
Start: 2020-01-09 | End: 2020-05-07 | Stop reason: ALTCHOICE

## 2020-01-09 NOTE — PROGRESS NOTES
Brian Piedra is a 62year old female. Patient presents with:   Follow - Up: 4 week follow up- left ear infections, per pt she noticed yellowish drainage at her left ear      HISTORY OF PRESENT ILLNESS    I have previously seen her for routine ear cleaning visit she was started on ofloxacin drops and states that she feels there is still plugged and still not hearing as well.  Some discomfort as well on that same side.  Previously noted to have an old T-tube in place that she is had for about 35 years. Osmar Kirkland s Cancer Father         esophageal (cause of death)   • Hypertension Mother        Past Medical History:   Diagnosis Date   • Diverticulitis 4/2009    medication   • Essential hypertension    • Migraines        Past Surgical History:   Procedure Laterality D Normal. Canal - Right: Normal, Left: Normal. TM - Right: Normal, Left: Old tube in place thickened eardrum with erythema and drainage.    Skin Normal Inspection - Normal.   Lungs   clear to auscultation no rhonchi rales or wheezes   Lymph Detail Normal Subm is due to an old tube with probably infected biofilm. I have recommended removal of this under anesthesia as I did try to remove this in the office and met with extreme pain and discomfort.   We will start her on ciprofloxacin and to continue TobraDex as t

## 2020-01-10 ENCOUNTER — PATIENT MESSAGE (OUTPATIENT)
Dept: OTOLARYNGOLOGY | Facility: CLINIC | Age: 55
End: 2020-01-10

## 2020-01-11 NOTE — TELEPHONE ENCOUNTER
Per pt is running low on tobramycin-dexamethasone 0.3-0.1 % Ophthalmic Suspension. States when she came in she had enough, is running out today. Wanting to know if can get prescription sent over.  Please advise

## 2020-01-13 NOTE — TELEPHONE ENCOUNTER
From: Shakila Ribeiro  To: Geovanni Mace. Cherylene Scotts, MD  Sent: 1/10/2020 6:50 AM CST  Subject: Visit Follow-up Question    I was reading through the instructions regarding my upcoming outpatient surgery.  I saw there is several medications to stay away from 2 weeks clay

## 2020-01-13 NOTE — TELEPHONE ENCOUNTER
please see note below and advise , pt scheduled for surgery on 01/22/20 for removal of left ear tube

## 2020-01-15 RX ORDER — TOBRAMYCIN AND DEXAMETHASONE 3; 1 MG/ML; MG/ML
SUSPENSION/ DROPS OPHTHALMIC
Qty: 5 ML | Refills: 0 | Status: SHIPPED | OUTPATIENT
Start: 2020-01-15 | End: 2020-05-07 | Stop reason: ALTCHOICE

## 2020-01-23 ENCOUNTER — TELEPHONE (OUTPATIENT)
Dept: OTOLARYNGOLOGY | Facility: CLINIC | Age: 55
End: 2020-01-23

## 2020-01-23 NOTE — TELEPHONE ENCOUNTER
Post op day 1 removal of left ear retained tube,patient stated she is doing fine ,no bleeding ,no fever ,reviewed postop medication. advised pt to call for any bleeding,fever greater than 102 or for any other concerns ,patient verbalized understanding and a

## 2020-02-06 ENCOUNTER — OFFICE VISIT (OUTPATIENT)
Dept: OTOLARYNGOLOGY | Facility: CLINIC | Age: 55
End: 2020-02-06
Payer: COMMERCIAL

## 2020-02-06 VITALS
SYSTOLIC BLOOD PRESSURE: 115 MMHG | HEIGHT: 67 IN | BODY MASS INDEX: 25.9 KG/M2 | DIASTOLIC BLOOD PRESSURE: 78 MMHG | TEMPERATURE: 98 F | WEIGHT: 165 LBS

## 2020-02-06 DIAGNOSIS — H72.91 PERFORATION OF RIGHT TYMPANIC MEMBRANE: Primary | ICD-10-CM

## 2020-02-06 DIAGNOSIS — H60.392 OTHER INFECTIVE ACUTE OTITIS EXTERNA OF LEFT EAR: ICD-10-CM

## 2020-02-06 PROCEDURE — 99213 OFFICE O/P EST LOW 20 MIN: CPT | Performed by: OTOLARYNGOLOGY

## 2020-02-07 NOTE — PROGRESS NOTES
Rozina Goodman is a 62year old female.   Patient presents with:  Post-Op: s/p removal of ear tube done on 1/22/20      HISTORY OF PRESENT ILLNESS    I have previously seen her for routine ear cleanings.  She presents today with complaints regarding a fullne feels there is still plugged and still not hearing as well.  Some discomfort as well on that same side.  Previously noted to have an old T-tube in place that she is had for about 35 years.  Had some drainage which was cleaned.  No new signs, symptoms or co Yes        Comment: 2 drinks weekly      Drug use: Never    Other Topics      Concerns:        Caffeine Concern: Yes          2 cups of coffee         Reaction to local anesthetic: No      Family History   Problem Relation Age of Onset   • Cancer Father Head/Face Normal Facial features - Normal. Eyebrows - Normal. Skull - Normal.        Nasopharynx Normal External nose - Normal. Lips/teeth/gums - Normal. Tonsils - Normal. Oropharynx - Normal.   Ears Normal Inspection - Right: Normal, Left: Normal. Canal tube appears to be closing. Strict water precautions return to see me in 3 months for reevaluation. Return to see me sooner if any new problems. Jose Pollard.  Jeny Jain MD    2/6/2020    7:27 PM

## 2020-03-06 ENCOUNTER — APPOINTMENT (OUTPATIENT)
Dept: CT IMAGING | Facility: HOSPITAL | Age: 55
End: 2020-03-06
Attending: EMERGENCY MEDICINE
Payer: COMMERCIAL

## 2020-03-06 ENCOUNTER — HOSPITAL ENCOUNTER (EMERGENCY)
Facility: HOSPITAL | Age: 55
Discharge: HOME | End: 2020-03-06
Attending: EMERGENCY MEDICINE
Payer: COMMERCIAL

## 2020-03-06 VITALS
RESPIRATION RATE: 20 BRPM | BODY MASS INDEX: 25.58 KG/M2 | DIASTOLIC BLOOD PRESSURE: 101 MMHG | TEMPERATURE: 97 F | HEIGHT: 67 IN | HEART RATE: 86 BPM | OXYGEN SATURATION: 97 % | WEIGHT: 163 LBS | SYSTOLIC BLOOD PRESSURE: 139 MMHG

## 2020-03-06 DIAGNOSIS — N20.1 LEFT URETERAL STONE: Primary | ICD-10-CM

## 2020-03-06 LAB
ANION GAP SERPL CALC-SCNC: 6 MMOL/L (ref 0–18)
BACTERIA UR QL AUTO: NEGATIVE /HPF
BASOPHILS # BLD AUTO: 0.03 X10(3) UL (ref 0–0.2)
BASOPHILS NFR BLD AUTO: 0.5 %
BILIRUB UR QL: NEGATIVE
BUN BLD-MCNC: 21 MG/DL (ref 7–18)
BUN/CREAT SERPL: 22.8 (ref 10–20)
CALCIUM BLD-MCNC: 9.9 MG/DL (ref 8.5–10.1)
CHLORIDE SERPL-SCNC: 106 MMOL/L (ref 98–112)
CLARITY UR: CLEAR
CO2 SERPL-SCNC: 27 MMOL/L (ref 21–32)
COLOR UR: YELLOW
CREAT BLD-MCNC: 0.92 MG/DL (ref 0.55–1.02)
DEPRECATED RDW RBC AUTO: 39.4 FL (ref 35.1–46.3)
EOSINOPHIL # BLD AUTO: 0.16 X10(3) UL (ref 0–0.7)
EOSINOPHIL NFR BLD AUTO: 2.5 %
ERYTHROCYTE [DISTWIDTH] IN BLOOD BY AUTOMATED COUNT: 13.2 % (ref 11–15)
GLUCOSE BLD-MCNC: 108 MG/DL (ref 70–99)
GLUCOSE UR-MCNC: NEGATIVE MG/DL
HCT VFR BLD AUTO: 41.7 % (ref 35–48)
HGB BLD-MCNC: 13.7 G/DL (ref 12–16)
IMM GRANULOCYTES # BLD AUTO: 0.01 X10(3) UL (ref 0–1)
IMM GRANULOCYTES NFR BLD: 0.2 %
LYMPHOCYTES # BLD AUTO: 1.64 X10(3) UL (ref 1–4)
LYMPHOCYTES NFR BLD AUTO: 25.3 %
MCH RBC QN AUTO: 26.9 PG (ref 26–34)
MCHC RBC AUTO-ENTMCNC: 32.9 G/DL (ref 31–37)
MCV RBC AUTO: 81.9 FL (ref 80–100)
MONOCYTES # BLD AUTO: 0.52 X10(3) UL (ref 0.1–1)
MONOCYTES NFR BLD AUTO: 8 %
NEUTROPHILS # BLD AUTO: 4.11 X10 (3) UL (ref 1.5–7.7)
NEUTROPHILS # BLD AUTO: 4.11 X10(3) UL (ref 1.5–7.7)
NEUTROPHILS NFR BLD AUTO: 63.5 %
NITRITE UR QL STRIP.AUTO: NEGATIVE
OSMOLALITY SERPL CALC.SUM OF ELEC: 292 MOSM/KG (ref 275–295)
PH UR: 7 [PH] (ref 5–8)
PLATELET # BLD AUTO: 249 10(3)UL (ref 150–450)
POTASSIUM SERPL-SCNC: 4.3 MMOL/L (ref 3.5–5.1)
PROT UR-MCNC: NEGATIVE MG/DL
RBC # BLD AUTO: 5.09 X10(6)UL (ref 3.8–5.3)
RBC #/AREA URNS AUTO: 40 /HPF
SODIUM SERPL-SCNC: 139 MMOL/L (ref 136–145)
SP GR UR STRIP: 1.02 (ref 1–1.03)
UROBILINOGEN UR STRIP-ACNC: <2
WBC # BLD AUTO: 6.5 X10(3) UL (ref 4–11)
WBC #/AREA URNS AUTO: 4 /HPF

## 2020-03-06 PROCEDURE — 80048 BASIC METABOLIC PNL TOTAL CA: CPT

## 2020-03-06 PROCEDURE — 96374 THER/PROPH/DIAG INJ IV PUSH: CPT

## 2020-03-06 PROCEDURE — 80048 BASIC METABOLIC PNL TOTAL CA: CPT | Performed by: EMERGENCY MEDICINE

## 2020-03-06 PROCEDURE — 96375 TX/PRO/DX INJ NEW DRUG ADDON: CPT

## 2020-03-06 PROCEDURE — 74177 CT ABD & PELVIS W/CONTRAST: CPT | Performed by: EMERGENCY MEDICINE

## 2020-03-06 PROCEDURE — 85025 COMPLETE CBC W/AUTO DIFF WBC: CPT | Performed by: EMERGENCY MEDICINE

## 2020-03-06 PROCEDURE — 85025 COMPLETE CBC W/AUTO DIFF WBC: CPT

## 2020-03-06 PROCEDURE — 81001 URINALYSIS AUTO W/SCOPE: CPT | Performed by: EMERGENCY MEDICINE

## 2020-03-06 PROCEDURE — 99284 EMERGENCY DEPT VISIT MOD MDM: CPT

## 2020-03-06 RX ORDER — ONDANSETRON 4 MG/1
4 TABLET, ORALLY DISINTEGRATING ORAL EVERY 8 HOURS PRN
Qty: 20 TABLET | Refills: 0 | Status: SHIPPED | OUTPATIENT
Start: 2020-03-06 | End: 2020-07-22 | Stop reason: ALTCHOICE

## 2020-03-06 RX ORDER — HYDROCODONE BITARTRATE AND ACETAMINOPHEN 5; 325 MG/1; MG/1
1-2 TABLET ORAL EVERY 8 HOURS PRN
Qty: 14 TABLET | Refills: 0 | Status: SHIPPED | OUTPATIENT
Start: 2020-03-06 | End: 2020-03-13

## 2020-03-06 RX ORDER — ONDANSETRON 2 MG/ML
4 INJECTION INTRAMUSCULAR; INTRAVENOUS ONCE
Status: COMPLETED | OUTPATIENT
Start: 2020-03-06 | End: 2020-03-06

## 2020-03-06 RX ORDER — KETOROLAC TROMETHAMINE 30 MG/ML
30 INJECTION, SOLUTION INTRAMUSCULAR; INTRAVENOUS ONCE
Status: COMPLETED | OUTPATIENT
Start: 2020-03-06 | End: 2020-03-06

## 2020-03-06 RX ORDER — MORPHINE SULFATE 4 MG/ML
4 INJECTION, SOLUTION INTRAMUSCULAR; INTRAVENOUS ONCE
Status: COMPLETED | OUTPATIENT
Start: 2020-03-06 | End: 2020-03-06

## 2020-03-07 NOTE — ED NOTES
PATIENT APPROVED FOR DISCHARGE PER ER PROVIDER. PATIENT GIVEN VERBAL AND WRITTEN DISCHARGE INSTRUCTIONS. GIVEN INSTRUCTIONS ON RX X 2, FOLLOW UP WITH uroglogy, patient given strainer, and sterile cup, education provided.  VERBALIZES UNDERSTANDING AND SIGNED

## 2020-03-07 NOTE — ED PROVIDER NOTES
Patient Seen in: [facility] AND [facility] Emergency Department    History   Patient presents with:  Abdomen/Flank Pain    Stated Complaint: abd pain since yesterday     HPI    Patient is here with complaint of left lower quadrant pain that started yesterday mor (5 mg total) by mouth daily. Metoprolol Succinate ER (TOPROL XL) 50 MG Oral Tablet 24 Hr,  Take 1 tablet (50 mg total) by mouth daily. LORazepam 0.5 MG Oral Tab,     Naproxen Sodium 550 MG Oral Tab,  Take  by mouth.          Review of Systems  Constitut follow-up with Dr. Thomas Traylor. I did contact her she will see her in follow-up. I discussed pain control pathophysiology expected course of healing that she probably will need intervention.   She is with her  he will drive her home    ED Course     L Refills: 0    HYDROcodone-acetaminophen 5-325 MG Oral Tab  Take 1-2 tablets by mouth every 8 (eight) hours as needed for Pain.   Qty: 14 tablet Refills: 0

## 2020-03-07 NOTE — ED NOTES
Rounding complete    Patient medicated for pain, will reevaluate   brp offered  Awaiting er md reeval  Call light within reach, will continue to monitor

## 2020-03-07 NOTE — ED NOTES
Rounding completed    Patient states minimal pain relief  Awaiting lab/ct results  brp offered   at bedside  Call light wihtin reach

## 2020-03-07 NOTE — ED NOTES
PATIENT AXO3 TO ED VIA PRIVATE VEHICLE AKIN CO OF LLQ ABD PAIN X 2 DAYS, WORSENING X TODAY PAIN 10/10 PATIENT TEARFUL FROM TRIAGE +NAUSEA.  DENIES URINARY SX. HX OF DIVERTICULITIS, DENIES CONSTIPATION/FEVER/DIARRHEA

## 2020-03-09 ENCOUNTER — APPOINTMENT (OUTPATIENT)
Dept: LAB | Facility: HOSPITAL | Age: 55
End: 2020-03-09
Attending: UROLOGY
Payer: COMMERCIAL

## 2020-03-09 DIAGNOSIS — N20.0 KIDNEY STONE: ICD-10-CM

## 2020-03-09 PROCEDURE — 93010 ELECTROCARDIOGRAM REPORT: CPT | Performed by: UROLOGY

## 2020-03-09 PROCEDURE — 87086 URINE CULTURE/COLONY COUNT: CPT

## 2020-03-09 PROCEDURE — 93005 ELECTROCARDIOGRAM TRACING: CPT

## 2020-03-18 ENCOUNTER — MED REC SCAN ONLY (OUTPATIENT)
Dept: FAMILY MEDICINE CLINIC | Facility: CLINIC | Age: 55
End: 2020-03-18

## 2020-03-26 ENCOUNTER — MED REC SCAN ONLY (OUTPATIENT)
Dept: FAMILY MEDICINE CLINIC | Facility: CLINIC | Age: 55
End: 2020-03-26

## 2020-03-26 ENCOUNTER — MEDICAL CORRESPONDENCE (OUTPATIENT)
Dept: FAMILY MEDICINE CLINIC | Facility: CLINIC | Age: 55
End: 2020-03-26

## 2020-03-27 ENCOUNTER — TELEPHONE (OUTPATIENT)
Dept: FAMILY MEDICINE CLINIC | Facility: CLINIC | Age: 55
End: 2020-03-27

## 2020-03-27 RX ORDER — METOPROLOL SUCCINATE 50 MG/1
50 TABLET, EXTENDED RELEASE ORAL DAILY
Qty: 90 TABLET | Refills: 0 | Status: SHIPPED | OUTPATIENT
Start: 2020-03-27 | End: 2020-06-29

## 2020-03-27 NOTE — TELEPHONE ENCOUNTER
•  Metoprolol Succinate ER (TOPROL XL) 50 MG Oral Tablet 24 Hr, Take 1 tablet (50 mg total) by mouth daily. , Disp: 90 tablet, Rfl: 3

## 2020-04-13 RX ORDER — AMLODIPINE BESYLATE 5 MG/1
TABLET ORAL
Qty: 90 TABLET | Refills: 0 | Status: SHIPPED | OUTPATIENT
Start: 2020-04-13 | End: 2020-07-22

## 2020-04-13 NOTE — TELEPHONE ENCOUNTER
Refill noted. Chart reviewed. Okay to fill times one for 90 day supply with no additional refills. Needs to follow-up with physical to get more fills. Notify pt. Refilled on behalf of Dr. Puja Mendieta.

## 2020-04-20 ENCOUNTER — MED REC SCAN ONLY (OUTPATIENT)
Dept: FAMILY MEDICINE CLINIC | Facility: CLINIC | Age: 55
End: 2020-04-20

## 2020-05-07 ENCOUNTER — OFFICE VISIT (OUTPATIENT)
Dept: OTOLARYNGOLOGY | Facility: CLINIC | Age: 55
End: 2020-05-07
Payer: COMMERCIAL

## 2020-05-07 VITALS
BODY MASS INDEX: 25.9 KG/M2 | DIASTOLIC BLOOD PRESSURE: 79 MMHG | TEMPERATURE: 98 F | WEIGHT: 165 LBS | SYSTOLIC BLOOD PRESSURE: 116 MMHG | HEIGHT: 67 IN

## 2020-05-07 DIAGNOSIS — H72.91 PERFORATION OF RIGHT TYMPANIC MEMBRANE: Primary | ICD-10-CM

## 2020-05-07 DIAGNOSIS — H60.391 OTHER INFECTIVE ACUTE OTITIS EXTERNA OF RIGHT EAR: ICD-10-CM

## 2020-05-07 PROCEDURE — 99213 OFFICE O/P EST LOW 20 MIN: CPT | Performed by: OTOLARYNGOLOGY

## 2020-05-07 PROCEDURE — 92504 EAR MICROSCOPY EXAMINATION: CPT | Performed by: OTOLARYNGOLOGY

## 2020-05-07 RX ORDER — TAMSULOSIN HYDROCHLORIDE 0.4 MG/1
0.4 CAPSULE ORAL DAILY
COMMUNITY
Start: 2020-03-09 | End: 2020-07-22 | Stop reason: ALTCHOICE

## 2020-05-07 RX ORDER — TOBRAMYCIN AND DEXAMETHASONE 3; 1 MG/ML; MG/ML
SUSPENSION/ DROPS OPHTHALMIC
Qty: 5 ML | Refills: 1 | Status: SHIPPED | OUTPATIENT
Start: 2020-05-07 | End: 2021-06-22 | Stop reason: ALTCHOICE

## 2020-05-07 RX ORDER — AMITRIPTYLINE HYDROCHLORIDE 50 MG/1
50 TABLET, FILM COATED ORAL NIGHTLY
COMMUNITY
End: 2020-05-29

## 2020-05-29 RX ORDER — AMITRIPTYLINE HYDROCHLORIDE 50 MG/1
TABLET, FILM COATED ORAL
Qty: 90 TABLET | Refills: 0 | Status: SHIPPED | OUTPATIENT
Start: 2020-05-29 | End: 2020-07-22

## 2020-06-10 ENCOUNTER — OFFICE VISIT (OUTPATIENT)
Dept: OTOLARYNGOLOGY | Facility: CLINIC | Age: 55
End: 2020-06-10
Payer: COMMERCIAL

## 2020-06-10 VITALS
DIASTOLIC BLOOD PRESSURE: 87 MMHG | WEIGHT: 165 LBS | TEMPERATURE: 98 F | SYSTOLIC BLOOD PRESSURE: 145 MMHG | HEIGHT: 67 IN | HEART RATE: 71 BPM | BODY MASS INDEX: 25.9 KG/M2

## 2020-06-10 DIAGNOSIS — H65.90 FLUID COLLECTION OF MIDDLE EAR: ICD-10-CM

## 2020-06-10 DIAGNOSIS — H72.91 PERFORATION OF RIGHT TYMPANIC MEMBRANE: Primary | ICD-10-CM

## 2020-06-10 PROCEDURE — 99213 OFFICE O/P EST LOW 20 MIN: CPT | Performed by: OTOLARYNGOLOGY

## 2020-06-10 RX ORDER — CIPROFLOXACIN AND DEXAMETHASONE 3; 1 MG/ML; MG/ML
3 SUSPENSION/ DROPS AURICULAR (OTIC) 3 TIMES DAILY
Qty: 1 BOTTLE | Refills: 1 | Status: SHIPPED | OUTPATIENT
Start: 2020-06-10 | End: 2020-06-17

## 2020-06-10 NOTE — PROGRESS NOTES
Morro Benton is a 60year old female. Patient presents with:   Follow - Up: regarding acute otitis externa of right ear, c/o decreased hearing of right ear       HISTORY OF PRESENT ILLNESS    I have previously seen her for routine ear cleanings.  She pres started on ofloxacin drops and states that she feels there is still plugged and still not hearing as well.  Some discomfort as well on that same side.  Previously noted to have an old T-tube in place that she is had for about 35 years.  Had some drainage w signs, symptoms or complaints. She has used TobraDex eardrops in the past on the left with good resolution of her symptoms. No complaints or concerns on the left at this time.     6/10/2020 having more issues with decreased hearing on the right.   When sh and syncope. GI Negative Abdominal pain and diarrhea. Endocrine Negative Cold intolerance and heat intolerance. Neuro Negative Tremors. Psych Negative Anxiety and depression.    Integumentary Negative Frequent skin infections, pigment change and hao when performing Valsalva    Current Outpatient Medications:   •  ciprofloxacin-dexamethasone 0.3-0.1 % Otic Suspension, Place 3 drops into the right ear 3 (three) times daily for 7 days. , Disp: 1 Bottle, Rfl: 1  •  AMITRIPTYLINE HCL 50 MG Oral Tab, TAKE 1

## 2020-06-11 ENCOUNTER — OFFICE VISIT (OUTPATIENT)
Dept: DERMATOLOGY CLINIC | Facility: CLINIC | Age: 55
End: 2020-06-11
Payer: COMMERCIAL

## 2020-06-11 DIAGNOSIS — D22.9 MULTIPLE BENIGN NEVI: ICD-10-CM

## 2020-06-11 DIAGNOSIS — L30.9 DERMATITIS: Primary | ICD-10-CM

## 2020-06-11 DIAGNOSIS — D18.01 HEMANGIOMA OF SKIN AND SUBCUTANEOUS TISSUE: ICD-10-CM

## 2020-06-11 DIAGNOSIS — D23.71 DERMATOFIBROMA OF RIGHT LOWER EXTREMITY: ICD-10-CM

## 2020-06-11 DIAGNOSIS — L81.4 SOLAR LENTIGO: ICD-10-CM

## 2020-06-11 DIAGNOSIS — L57.8 SUN-DAMAGED SKIN: ICD-10-CM

## 2020-06-11 DIAGNOSIS — L82.1 SEBORRHEIC KERATOSES: ICD-10-CM

## 2020-06-11 DIAGNOSIS — Z12.83 SKIN CANCER SCREENING: ICD-10-CM

## 2020-06-11 PROCEDURE — 99213 OFFICE O/P EST LOW 20 MIN: CPT | Performed by: DERMATOLOGY

## 2020-06-11 NOTE — PROGRESS NOTES
HPI:     Chief Complaint     Full Skin Exam        HPI     Full Skin Exam      Additional comments: LOV 6/6/2019 Patient present for full body skin exam .Patient denies any hx of sc          Last edited by Evangelina Tom, Roberto Mcconnell on 6/11/2020 10:40 AM. (Histo EVERY 8 HOURS 5 mL 1   • AMLODIPINE BESYLATE 5 MG Oral Tab TAKE 1 TABLET(5 MG) BY MOUTH DAILY 90 tablet 0   • Metoprolol Succinate ER (TOPROL XL) 50 MG Oral Tablet 24 Hr Take 1 tablet (50 mg total) by mouth daily.  90 tablet 0   • ondansetron 4 MG Oral Tabl use: Never      Sexual activity: Not on file    Lifestyle      Physical activity:        Days per week: Not on file        Minutes per session: Not on file      Stress: Not on file    Relationships      Social connections:        Talks on phone: Not on audra following:  - there is some marked UV pigmentation across shoulders and chest  - melanocytic nevi are uniform in color, shape and borders. Of note there is one on the  second toe that was pointed out to patient.   Homogeneous light brown color with regular defined types were placed in this encounter.         6/11/2020  Dillon Robledo

## 2020-06-29 RX ORDER — METOPROLOL SUCCINATE 50 MG/1
TABLET, EXTENDED RELEASE ORAL
Qty: 30 TABLET | Refills: 0 | Status: SHIPPED | OUTPATIENT
Start: 2020-06-29 | End: 2020-07-22

## 2020-06-29 NOTE — TELEPHONE ENCOUNTER
Refill noted. Chart reviewed. Okay to fill times one for 30 day supply with no additoinal refills. Due for physical. Refilled on behalf of Dr. Reji Castrejon.

## 2020-07-22 ENCOUNTER — OFFICE VISIT (OUTPATIENT)
Dept: FAMILY MEDICINE CLINIC | Facility: CLINIC | Age: 55
End: 2020-07-22
Payer: COMMERCIAL

## 2020-07-22 VITALS
BODY MASS INDEX: 26.68 KG/M2 | SYSTOLIC BLOOD PRESSURE: 120 MMHG | HEART RATE: 83 BPM | DIASTOLIC BLOOD PRESSURE: 80 MMHG | WEIGHT: 170 LBS | TEMPERATURE: 99 F | HEIGHT: 67 IN

## 2020-07-22 DIAGNOSIS — N20.0 CALCULUS OF KIDNEY: Primary | ICD-10-CM

## 2020-07-22 DIAGNOSIS — Z12.31 ENCOUNTER FOR SCREENING MAMMOGRAM FOR MALIGNANT NEOPLASM OF BREAST: ICD-10-CM

## 2020-07-22 DIAGNOSIS — Z00.00 ROUTINE GENERAL MEDICAL EXAMINATION AT A HEALTH CARE FACILITY: ICD-10-CM

## 2020-07-22 PROBLEM — L30.9 DERMATITIS: Status: RESOLVED | Noted: 2019-06-06 | Resolved: 2020-07-22

## 2020-07-22 PROBLEM — Z98.890: Status: ACTIVE | Noted: 2020-07-22

## 2020-07-22 PROBLEM — L21.9 SEBORRHEIC DERMATITIS: Status: ACTIVE | Noted: 2019-06-06

## 2020-07-22 PROCEDURE — 99396 PREV VISIT EST AGE 40-64: CPT | Performed by: FAMILY MEDICINE

## 2020-07-22 PROCEDURE — 3008F BODY MASS INDEX DOCD: CPT | Performed by: FAMILY MEDICINE

## 2020-07-22 PROCEDURE — 3074F SYST BP LT 130 MM HG: CPT | Performed by: FAMILY MEDICINE

## 2020-07-22 PROCEDURE — 3079F DIAST BP 80-89 MM HG: CPT | Performed by: FAMILY MEDICINE

## 2020-07-22 RX ORDER — AMLODIPINE BESYLATE 5 MG/1
5 TABLET ORAL DAILY
Qty: 90 TABLET | Refills: 3 | Status: SHIPPED | OUTPATIENT
Start: 2020-07-22 | End: 2021-08-05

## 2020-07-22 RX ORDER — AMITRIPTYLINE HYDROCHLORIDE 50 MG/1
50 TABLET, FILM COATED ORAL NIGHTLY
Qty: 90 TABLET | Refills: 3 | Status: SHIPPED | OUTPATIENT
Start: 2020-07-22 | End: 2020-09-01

## 2020-07-22 RX ORDER — METOPROLOL SUCCINATE 50 MG/1
50 TABLET, EXTENDED RELEASE ORAL DAILY
Qty: 90 TABLET | Refills: 3 | Status: SHIPPED | OUTPATIENT
Start: 2020-07-22 | End: 2021-08-05

## 2020-07-22 NOTE — PROGRESS NOTES
HPI:    Patient ID: Sulma Borrego is a 60year old female.     HPI  Patient presents with:  Physical: annual physical     Past Medical History:   Diagnosis Date   • Calculus of kidney    • Diverticulitis 4/2009    medication   • Essential hypertension    • and reactive to light. Conjunctivae and EOM are normal.   Fundoscopic exam:       The right eye shows no AV nicking and no hemorrhage. The left eye shows no AV nicking and no hemorrhage. Neck: Normal range of motion. Neck supple.  No thyroid mass a

## 2020-08-07 ENCOUNTER — LAB ENCOUNTER (OUTPATIENT)
Dept: LAB | Facility: HOSPITAL | Age: 55
End: 2020-08-07
Attending: FAMILY MEDICINE
Payer: COMMERCIAL

## 2020-08-07 DIAGNOSIS — N20.0 KIDNEY STONE: Primary | ICD-10-CM

## 2020-08-07 DIAGNOSIS — Z00.00 ROUTINE GENERAL MEDICAL EXAMINATION AT A HEALTH CARE FACILITY: ICD-10-CM

## 2020-08-07 LAB
ALBUMIN SERPL-MCNC: 3.7 G/DL (ref 3.4–5)
ALBUMIN/GLOB SERPL: 1.1 {RATIO} (ref 1–2)
ALP LIVER SERPL-CCNC: 100 U/L (ref 46–118)
ALT SERPL-CCNC: 36 U/L (ref 13–56)
ANION GAP SERPL CALC-SCNC: 6 MMOL/L (ref 0–18)
AST SERPL-CCNC: 15 U/L (ref 15–37)
BASOPHILS # BLD AUTO: 0.04 X10(3) UL (ref 0–0.2)
BASOPHILS NFR BLD AUTO: 0.9 %
BILIRUB SERPL-MCNC: 0.6 MG/DL (ref 0.1–2)
BUN BLD-MCNC: 15 MG/DL (ref 7–18)
BUN/CREAT SERPL: 18.1 (ref 10–20)
CALCIUM BLD-MCNC: 9.5 MG/DL (ref 8.5–10.1)
CHLORIDE SERPL-SCNC: 107 MMOL/L (ref 98–112)
CHOLEST SMN-MCNC: 205 MG/DL (ref ?–200)
CO2 SERPL-SCNC: 28 MMOL/L (ref 21–32)
CREAT BLD-MCNC: 0.83 MG/DL (ref 0.55–1.02)
DEPRECATED RDW RBC AUTO: 39.8 FL (ref 35.1–46.3)
EOSINOPHIL # BLD AUTO: 0.19 X10(3) UL (ref 0–0.7)
EOSINOPHIL NFR BLD AUTO: 4.4 %
ERYTHROCYTE [DISTWIDTH] IN BLOOD BY AUTOMATED COUNT: 13.2 % (ref 11–15)
GLOBULIN PLAS-MCNC: 3.3 G/DL (ref 2.8–4.4)
GLUCOSE BLD-MCNC: 83 MG/DL (ref 70–99)
HCT VFR BLD AUTO: 42.1 % (ref 35–48)
HDLC SERPL-MCNC: 65 MG/DL (ref 40–59)
HGB BLD-MCNC: 13.4 G/DL (ref 12–16)
IMM GRANULOCYTES # BLD AUTO: 0 X10(3) UL (ref 0–1)
IMM GRANULOCYTES NFR BLD: 0 %
LDLC SERPL CALC-MCNC: 118 MG/DL (ref ?–100)
LYMPHOCYTES # BLD AUTO: 1.48 X10(3) UL (ref 1–4)
LYMPHOCYTES NFR BLD AUTO: 34.3 %
M PROTEIN MFR SERPL ELPH: 7 G/DL (ref 6.4–8.2)
MCH RBC QN AUTO: 26.5 PG (ref 26–34)
MCHC RBC AUTO-ENTMCNC: 31.8 G/DL (ref 31–37)
MCV RBC AUTO: 83.2 FL (ref 80–100)
MONOCYTES # BLD AUTO: 0.4 X10(3) UL (ref 0.1–1)
MONOCYTES NFR BLD AUTO: 9.3 %
NEUTROPHILS # BLD AUTO: 2.2 X10 (3) UL (ref 1.5–7.7)
NEUTROPHILS # BLD AUTO: 2.2 X10(3) UL (ref 1.5–7.7)
NEUTROPHILS NFR BLD AUTO: 51.1 %
NONHDLC SERPL-MCNC: 140 MG/DL (ref ?–130)
OSMOLALITY SERPL CALC.SUM OF ELEC: 292 MOSM/KG (ref 275–295)
PATIENT FASTING Y/N/NP: YES
PATIENT FASTING Y/N/NP: YES
PLATELET # BLD AUTO: 242 10(3)UL (ref 150–450)
POTASSIUM SERPL-SCNC: 4.5 MMOL/L (ref 3.5–5.1)
PTH-INTACT SERPL-MCNC: 54.6 PG/ML (ref 18.5–88)
RBC # BLD AUTO: 5.06 X10(6)UL (ref 3.8–5.3)
SODIUM SERPL-SCNC: 141 MMOL/L (ref 136–145)
TRIGL SERPL-MCNC: 112 MG/DL (ref 30–149)
URATE SERPL-MCNC: 5.2 MG/DL (ref 2.6–6)
VLDLC SERPL CALC-MCNC: 22 MG/DL (ref 0–30)
WBC # BLD AUTO: 4.3 X10(3) UL (ref 4–11)

## 2020-08-07 PROCEDURE — 84550 ASSAY OF BLOOD/URIC ACID: CPT

## 2020-08-07 PROCEDURE — 80061 LIPID PANEL: CPT

## 2020-08-07 PROCEDURE — 36415 COLL VENOUS BLD VENIPUNCTURE: CPT

## 2020-08-07 PROCEDURE — 85025 COMPLETE CBC W/AUTO DIFF WBC: CPT

## 2020-08-07 PROCEDURE — 83970 ASSAY OF PARATHORMONE: CPT

## 2020-08-07 PROCEDURE — 80053 COMPREHEN METABOLIC PANEL: CPT

## 2020-09-01 RX ORDER — AMITRIPTYLINE HYDROCHLORIDE 50 MG/1
TABLET, FILM COATED ORAL
Qty: 90 TABLET | Refills: 3 | Status: SHIPPED | OUTPATIENT
Start: 2020-09-01 | End: 2021-08-23

## 2020-11-05 ENCOUNTER — OFFICE VISIT (OUTPATIENT)
Dept: OTOLARYNGOLOGY | Facility: CLINIC | Age: 55
End: 2020-11-05
Payer: COMMERCIAL

## 2020-11-05 VITALS
TEMPERATURE: 98 F | WEIGHT: 165 LBS | DIASTOLIC BLOOD PRESSURE: 89 MMHG | SYSTOLIC BLOOD PRESSURE: 128 MMHG | BODY MASS INDEX: 25.9 KG/M2 | HEIGHT: 67 IN

## 2020-11-05 DIAGNOSIS — H72.91 PERFORATION OF RIGHT TYMPANIC MEMBRANE: Primary | ICD-10-CM

## 2020-11-05 DIAGNOSIS — H61.23 BILATERAL IMPACTED CERUMEN: ICD-10-CM

## 2020-11-05 PROCEDURE — 3008F BODY MASS INDEX DOCD: CPT | Performed by: OTOLARYNGOLOGY

## 2020-11-05 PROCEDURE — 3079F DIAST BP 80-89 MM HG: CPT | Performed by: OTOLARYNGOLOGY

## 2020-11-05 PROCEDURE — 69210 REMOVE IMPACTED EAR WAX UNI: CPT | Performed by: OTOLARYNGOLOGY

## 2020-11-05 PROCEDURE — 3074F SYST BP LT 130 MM HG: CPT | Performed by: OTOLARYNGOLOGY

## 2020-11-05 NOTE — PROGRESS NOTES
Carri Newell is a 60year old female.   Patient presents with:  Cerumen Impaction: patient is here for ear cleaning      HISTORY OF PRESENT ILLNESS  I have previously seen her for routine ear cleanings.  She presents today with complaints regarding a fulln feels there is still plugged and still not hearing as well.  Some discomfort as well on that same side.  Previously noted to have an old T-tube in place that she is had for about 35 years.  Had some drainage which was cleaned.  No new signs, symptoms or co TobraDex eardrops in the past on the left with good resolution of her symptoms.  No complaints or concerns on the left at this time.     6/10/2020 having more issues with decreased hearing on the right.   When she pops her ear it seems to improve her hearin Negative Chest pain, irregular heartbeat/palpitations and syncope. GI Negative Abdominal pain and diarrhea. Endocrine Negative Cold intolerance and heat intolerance. Neuro Negative Tremors. Psych Negative Anxiety and depression.    Integumentary Neg Succinate ER 50 MG Oral Tablet 24 Hr, Take 1 tablet (50 mg total) by mouth daily. , Disp: 90 tablet, Rfl: 3  •  triamcinolone acetonide 0.1 % External Cream, Apply to affected area 1-2x/day as needed-for dermatitis on thigh, Disp: 30 g, Rfl: 1  •  Melatonin

## 2020-12-22 ENCOUNTER — OFFICE VISIT (OUTPATIENT)
Dept: OTOLARYNGOLOGY | Facility: CLINIC | Age: 55
End: 2020-12-22
Payer: COMMERCIAL

## 2020-12-22 VITALS
BODY MASS INDEX: 26.68 KG/M2 | TEMPERATURE: 98 F | HEIGHT: 67 IN | WEIGHT: 170 LBS | SYSTOLIC BLOOD PRESSURE: 118 MMHG | DIASTOLIC BLOOD PRESSURE: 68 MMHG

## 2020-12-22 DIAGNOSIS — H60.392 OTHER INFECTIVE ACUTE OTITIS EXTERNA OF LEFT EAR: Primary | ICD-10-CM

## 2020-12-22 PROCEDURE — 3074F SYST BP LT 130 MM HG: CPT | Performed by: OTOLARYNGOLOGY

## 2020-12-22 PROCEDURE — 92504 EAR MICROSCOPY EXAMINATION: CPT | Performed by: OTOLARYNGOLOGY

## 2020-12-22 PROCEDURE — 99213 OFFICE O/P EST LOW 20 MIN: CPT | Performed by: OTOLARYNGOLOGY

## 2020-12-22 PROCEDURE — 3008F BODY MASS INDEX DOCD: CPT | Performed by: OTOLARYNGOLOGY

## 2020-12-22 PROCEDURE — 3078F DIAST BP <80 MM HG: CPT | Performed by: OTOLARYNGOLOGY

## 2020-12-22 RX ORDER — CIPROFLOXACIN AND DEXAMETHASONE 3; 1 MG/ML; MG/ML
3 SUSPENSION/ DROPS AURICULAR (OTIC) 3 TIMES DAILY
Qty: 1 BOTTLE | Refills: 1 | Status: SHIPPED | OUTPATIENT
Start: 2020-12-22 | End: 2020-12-29

## 2020-12-22 NOTE — PROGRESS NOTES
Torsten Christensen is a 60year old female.   Patient presents with:  Ear Problem: Patient concern of right ear popping, light drainage, no pain       HISTORY OF PRESENT ILLNESS    I have previously seen her for routine ear cleanings.  She presents today with co drops and states that she feels there is still plugged and still not hearing as well.  Some discomfort as well on that same side.  Previously noted to have an old T-tube in place that she is had for about 35 years.  Had some drainage which was cleaned.  No complaints. Magali Washington has used TobraDex eardrops in the past on the left with good resolution of her symptoms.  No complaints or concerns on the left at this time.     6/10/2020 having more issues with decreased hearing on the right.  When she pops her ear it s BREAST BIOPSY  2010    breast lump    •       x2   • COLONOSCOPY     • CONTRACEPT IUD     • D & C      x2   • OTHER SURGICAL HISTORY      ear surgery   • TONSILLECTOMY           REVIEW OF SYSTEMS    System Neg/Pos Details   Constitutional Marilou Gamino Lymph Detail Normal Submental. Submandibular. Anterior cervical. Posterior cervical. Supraclavicular.         Nose/Mouth/Throat Normal External nose - Normal. Lips/teeth/gums - Normal. Tonsils - Normal. Oropharynx - Normal.   Nose/Mouth/Throat Normal Nare

## 2021-05-18 ENCOUNTER — NURSE TRIAGE (OUTPATIENT)
Dept: FAMILY MEDICINE CLINIC | Facility: CLINIC | Age: 56
End: 2021-05-18

## 2021-05-18 ENCOUNTER — OFFICE VISIT (OUTPATIENT)
Dept: FAMILY MEDICINE CLINIC | Facility: CLINIC | Age: 56
End: 2021-05-18
Payer: COMMERCIAL

## 2021-05-18 VITALS
HEIGHT: 67 IN | SYSTOLIC BLOOD PRESSURE: 123 MMHG | HEART RATE: 79 BPM | BODY MASS INDEX: 27.15 KG/M2 | WEIGHT: 173 LBS | DIASTOLIC BLOOD PRESSURE: 80 MMHG

## 2021-05-18 DIAGNOSIS — R60.0 LOCALIZED EDEMA: ICD-10-CM

## 2021-05-18 DIAGNOSIS — I10 ESSENTIAL HYPERTENSION: Primary | ICD-10-CM

## 2021-05-18 PROCEDURE — 3079F DIAST BP 80-89 MM HG: CPT | Performed by: FAMILY MEDICINE

## 2021-05-18 PROCEDURE — 3074F SYST BP LT 130 MM HG: CPT | Performed by: FAMILY MEDICINE

## 2021-05-18 PROCEDURE — 99213 OFFICE O/P EST LOW 20 MIN: CPT | Performed by: FAMILY MEDICINE

## 2021-05-18 PROCEDURE — 3008F BODY MASS INDEX DOCD: CPT | Performed by: FAMILY MEDICINE

## 2021-05-18 RX ORDER — HYDROCHLOROTHIAZIDE 25 MG/1
25 TABLET ORAL DAILY PRN
Qty: 30 TABLET | Refills: 0 | Status: SHIPPED | OUTPATIENT
Start: 2021-05-18

## 2021-05-18 NOTE — TELEPHONE ENCOUNTER
Action Requested: Summary for Provider     []  Critical Lab, Recommendations Needed  [] Need Additional Advice  [x]   FYI    []   Need Orders  [] Need Medications Sent to Pharmacy  []  Other     SUMMARY: Patient has been having on and off swelling in bilat

## 2021-05-19 NOTE — PROGRESS NOTES
HPI/Subjective:   Patient ID: Michael Antunez is a 60year old female. HPI  A few weeks of new onset ankle edema. Recently worse following her son's wedding. No SOB. Mostly resolved by morning.      History/Other:   Review of Systems   Constitutional: N types were placed in this encounter. Meds This Visit:  Requested Prescriptions     Signed Prescriptions Disp Refills   • hydrochlorothiazide 25 MG Oral Tab 30 tablet 0     Sig: Take 1 tablet (25 mg total) by mouth daily as needed.        Imaging & Refe

## 2021-06-22 ENCOUNTER — OFFICE VISIT (OUTPATIENT)
Dept: OTOLARYNGOLOGY | Facility: CLINIC | Age: 56
End: 2021-06-22
Payer: COMMERCIAL

## 2021-06-22 VITALS
BODY MASS INDEX: 26.68 KG/M2 | DIASTOLIC BLOOD PRESSURE: 80 MMHG | TEMPERATURE: 98 F | WEIGHT: 170 LBS | HEART RATE: 72 BPM | SYSTOLIC BLOOD PRESSURE: 108 MMHG | HEIGHT: 67 IN

## 2021-06-22 DIAGNOSIS — H60.392 OTHER INFECTIVE ACUTE OTITIS EXTERNA OF LEFT EAR: Primary | ICD-10-CM

## 2021-06-22 DIAGNOSIS — H61.23 BILATERAL IMPACTED CERUMEN: ICD-10-CM

## 2021-06-22 PROCEDURE — 3079F DIAST BP 80-89 MM HG: CPT | Performed by: OTOLARYNGOLOGY

## 2021-06-22 PROCEDURE — 99212 OFFICE O/P EST SF 10 MIN: CPT | Performed by: OTOLARYNGOLOGY

## 2021-06-22 PROCEDURE — 69210 REMOVE IMPACTED EAR WAX UNI: CPT | Performed by: OTOLARYNGOLOGY

## 2021-06-22 PROCEDURE — 3008F BODY MASS INDEX DOCD: CPT | Performed by: OTOLARYNGOLOGY

## 2021-06-22 PROCEDURE — 3074F SYST BP LT 130 MM HG: CPT | Performed by: OTOLARYNGOLOGY

## 2021-06-22 NOTE — PROGRESS NOTES
Tami Monique is a 60year old female.   Patient presents with:  Ear Problem: patient stated right ear plugged with little yellowish discharges,no pain  Cerumen Impaction: pt is here for ear cleaning      HISTORY OF PRESENT ILLNESS     I have previously see right ear.     11/21/19 last visit she was started on ofloxacin drops and states that she feels there is still plugged and still not hearing as well.  Some discomfort as well on that same side.  Previously noted to have an old T-tube in place that she is h days.  No real change in hearing.  No other signs, symptoms or complaints.  She has used TobraDex eardrops in the past on the left with good resolution of her symptoms.  No complaints or concerns on the left at this time.     6/10/2020 having more issues w (cause of death)   • Hypertension Mother        Past Medical History:   Diagnosis Date   • Calculus of kidney    • Diverticulitis 4/2009    medication   • Essential hypertension    • Migraines        Past Surgical History:   Procedure Laterality Date   • B Inspection - Right: Normal, Left: Normal. Canal - Right: Normal, Left: Normal. TM - Right: Normal, Left: Normal.  Cerumen impaction on the left cerumen and infected debris on the right all cleared using microscopy and suction   Skin Normal Inspection - Nor recommend use of drops on the right side for 1 week. Strict water precautions. Return to see me as needed        This note was prepared using [employer] voice recognition dictation software. As a result errors may occur.  When identified these errors

## 2021-08-05 RX ORDER — METOPROLOL SUCCINATE 50 MG/1
TABLET, EXTENDED RELEASE ORAL
Qty: 90 TABLET | Refills: 3 | Status: SHIPPED | OUTPATIENT
Start: 2021-08-05 | End: 2022-07-18

## 2021-08-05 RX ORDER — AMLODIPINE BESYLATE 5 MG/1
TABLET ORAL
Qty: 90 TABLET | Refills: 3 | Status: SHIPPED | OUTPATIENT
Start: 2021-08-05 | End: 2022-05-10

## 2021-08-23 RX ORDER — AMITRIPTYLINE HYDROCHLORIDE 50 MG/1
50 TABLET, FILM COATED ORAL NIGHTLY
Qty: 90 TABLET | Refills: 1 | Status: SHIPPED | OUTPATIENT
Start: 2021-08-23 | End: 2022-02-01

## 2021-08-23 NOTE — TELEPHONE ENCOUNTER
Refill passed per [address] [place] protocol.   Requested Prescriptions   Pending Prescriptions Disp Refills    AMITRIPTYLINE 50 MG Oral Tab [Pharmacy Med Name: AMITRIPTYLINE 50MG TABLETS] 90 tablet 3     Sig: TAKE 1 TABLET(50 MG) BY MOUTH EVERY NIGHT        Psychiatric Non-Scheduled (Anti-Anxiety) Passed - 8/22/2021  6:39 AM        Passed - Appointment in last 6 or next 3 months               Recent Outpatient Visits              2 months ago Other infective acute otitis externa of left ear    [facility], [address], Chong Linares MD    Office Visit    3 months ago Essential hypertension    [address] [place], [place]    Office Visit    8 months ago Other infective acute otitis externa of left ear    [facility], [address], Chong Linares MD    Office Visit    9 months ago Perforation of right tympanic membrane    [facility], [address], Chong Linares MD    Office Visit    1 year ago Calculus of kidney    [address] [place], [place]    Office Visit            Future Appointments         Provider Department Appt Notes    In 1 week [facility]; [address] Annual mammogram.

## 2021-08-31 ENCOUNTER — HOSPITAL ENCOUNTER (OUTPATIENT)
Dept: MAMMOGRAPHY | Age: 56
Discharge: HOME | End: 2021-08-31
Attending: OBSTETRICS & GYNECOLOGY
Payer: COMMERCIAL

## 2021-08-31 DIAGNOSIS — Z12.31 ENCOUNTER FOR SCREENING MAMMOGRAM FOR MALIGNANT NEOPLASM OF BREAST: ICD-10-CM

## 2021-08-31 PROCEDURE — 77067 SCR MAMMO BI INCL CAD: CPT | Performed by: OBSTETRICS & GYNECOLOGY

## 2021-08-31 PROCEDURE — 77063 BREAST TOMOSYNTHESIS BI: CPT | Performed by: OBSTETRICS & GYNECOLOGY

## 2021-09-09 ENCOUNTER — OFFICE VISIT (OUTPATIENT)
Dept: OTOLARYNGOLOGY | Facility: CLINIC | Age: 56
End: 2021-09-09
Payer: COMMERCIAL

## 2021-09-09 VITALS — WEIGHT: 170 LBS | BODY MASS INDEX: 26.68 KG/M2 | HEIGHT: 67 IN

## 2021-09-09 DIAGNOSIS — H60.392 OTHER INFECTIVE ACUTE OTITIS EXTERNA OF LEFT EAR: ICD-10-CM

## 2021-09-09 DIAGNOSIS — H72.91 PERFORATION OF RIGHT TYMPANIC MEMBRANE: Primary | ICD-10-CM

## 2021-09-09 DIAGNOSIS — K04.7 PERIAPICAL ABSCESS: ICD-10-CM

## 2021-09-09 PROCEDURE — 3008F BODY MASS INDEX DOCD: CPT | Performed by: OTOLARYNGOLOGY

## 2021-09-09 PROCEDURE — 99213 OFFICE O/P EST LOW 20 MIN: CPT | Performed by: OTOLARYNGOLOGY

## 2021-09-09 RX ORDER — CLINDAMYCIN HYDROCHLORIDE 300 MG/1
300 CAPSULE ORAL EVERY 8 HOURS
Qty: 21 CAPSULE | Refills: 0 | Status: SHIPPED | OUTPATIENT
Start: 2021-09-09 | End: 2021-09-16

## 2021-09-09 NOTE — PROGRESS NOTES
Jamal Bernabe is a 60year old female.   Patient presents with:  Ear Problem: right ear feels clogged   Sinus Problem: pain on right side cheek and teeth, seen by dentist xray negative, tender to touch , pt had swelling on tuesday which has resolved       H symptoms or complaints.  No problems with the right ear.     11/21/19 last visit she was started on ofloxacin drops and states that she feels there is still plugged and still not hearing as well.  Some discomfort as well on that same side.  Previously note this ear with slight discomfort in the past few days.  No real change in hearing.  No other signs, symptoms or complaints.  She has used TobraDex eardrops in the past on the left with good resolution of her symptoms.  No complaints or concerns on the left on file      Highest education level: Not on file    Tobacco Use      Smoking status: Former Smoker        Packs/day: 0.50        Years: 4.00        Pack years: 2        Types: Cigarettes      Smokeless tobacco: Never Used      Tobacco comment: quit 1984 situation. Appropriate mood and affect.    Neck Exam Normal Inspection - Normal. Palpation - Normal. Parotid gland - Normal. Thyroid gland - Normal.   Eyes Normal Conjunctiva - Right: Normal, Left: Normal. Pupil - Right: Normal, Left: Normal. Fundus - Right % External Cream, Apply to affected area 1-2x/day as needed-for dermatitis on thigh, Disp: 30 g, Rfl: 1  •  Melatonin 10 MG Oral Cap, , Disp: , Rfl:   ASSESSMENT AND PLAN    1.  Perforation of right tympanic membrane  Moist perforation on the right start

## 2021-09-10 ENCOUNTER — TELEPHONE (OUTPATIENT)
Dept: OTOLARYNGOLOGY | Facility: CLINIC | Age: 56
End: 2021-09-10

## 2021-09-10 NOTE — TELEPHONE ENCOUNTER
Pt called stating pt had an appointment yesterday 9-9-21. Given an order for a ct scan. Pt called the insurance and was advised the ct scan does not need prior authorization.   Please call pt

## 2021-09-14 ENCOUNTER — HOSPITAL ENCOUNTER (OUTPATIENT)
Dept: CT IMAGING | Facility: HOSPITAL | Age: 56
Discharge: HOME | End: 2021-09-14
Attending: OTOLARYNGOLOGY
Payer: COMMERCIAL

## 2021-09-14 DIAGNOSIS — K04.7 PERIAPICAL ABSCESS: ICD-10-CM

## 2021-09-14 PROCEDURE — 70486 CT MAXILLOFACIAL W/O DYE: CPT | Performed by: OTOLARYNGOLOGY

## 2021-09-16 ENCOUNTER — OFFICE VISIT (OUTPATIENT)
Dept: OTOLARYNGOLOGY | Facility: CLINIC | Age: 56
End: 2021-09-16
Payer: COMMERCIAL

## 2021-09-16 VITALS — TEMPERATURE: 97 F | BODY MASS INDEX: 26.68 KG/M2 | HEIGHT: 67 IN | WEIGHT: 170 LBS

## 2021-09-16 DIAGNOSIS — K04.7 PERIAPICAL ABSCESS: Primary | ICD-10-CM

## 2021-09-16 PROCEDURE — 3008F BODY MASS INDEX DOCD: CPT | Performed by: OTOLARYNGOLOGY

## 2021-09-16 PROCEDURE — 99213 OFFICE O/P EST LOW 20 MIN: CPT | Performed by: OTOLARYNGOLOGY

## 2021-09-17 ENCOUNTER — TELEPHONE (OUTPATIENT)
Dept: OTOLARYNGOLOGY | Facility: CLINIC | Age: 56
End: 2021-09-17

## 2021-09-17 RX ORDER — CLINDAMYCIN HYDROCHLORIDE 300 MG/1
300 CAPSULE ORAL 3 TIMES DAILY
Qty: 21 CAPSULE | Refills: 0 | Status: SHIPPED | OUTPATIENT
Start: 2021-09-17 | End: 2022-02-01

## 2021-09-23 NOTE — PROGRESS NOTES
Yang Jaquez is a 60year old female.   Patient presents with:  Test Results: discuss CT scan results       HISTORY OF PRESENT ILLNESS  I have previously seen her for routine ear cleanings.  She presents today with complaints regarding a fullness and disco is still plugged and still not hearing as well.  Some discomfort as well on that same side.  Previously noted to have an old T-tube in place that she is had for about 35 years.  Had some drainage which was cleaned.  No new signs, symptoms or complaints sto eardrops in the past on the left with good resolution of her symptoms.  No complaints or concerns on the left at this time.     6/10/2020 having more issues with decreased hearing on the right.  When she pops her ear it seems to improve her hearing. Tino Arndt adjacent buccal cortical surface breakthrough.  No adjacent well-defined/drainable fluid collection. Appears to have a periapical abscess with some adjacent buccal cortical surface breakthrough consistent with her pain and discomfort over that area.   I Psych Negative Anxiety and depression. Integumentary Negative Frequent skin infections, pigment change and rash. Hema/Lymph Negative Easy bleeding and easy bruising.            PHYSICAL EXAM    Temp 96.6 °F (35.9 °C) (Tympanic)   Ht 5' 7\" (1.702 m) hydrochlorothiazide 25 MG Oral Tab, Take 1 tablet (25 mg total) by mouth daily as needed. , Disp: 30 tablet, Rfl: 0  •  Clobetasol Propionate 0.05 % External Ointment, Apply to affected area once daily. , Disp: 30 g, Rfl: 0  •  triamcinolone acetonide 0.1 %

## 2021-09-30 ENCOUNTER — MED REC SCAN ONLY (OUTPATIENT)
Dept: FAMILY MEDICINE CLINIC | Facility: CLINIC | Age: 56
End: 2021-09-30

## 2022-02-01 ENCOUNTER — OFFICE VISIT (OUTPATIENT)
Dept: OTOLARYNGOLOGY | Facility: CLINIC | Age: 57
End: 2022-02-01
Payer: COMMERCIAL

## 2022-02-01 VITALS — TEMPERATURE: 99 F | HEIGHT: 67 IN | BODY MASS INDEX: 26.68 KG/M2 | WEIGHT: 170 LBS

## 2022-02-01 DIAGNOSIS — H60.392 OTHER INFECTIVE ACUTE OTITIS EXTERNA OF LEFT EAR: ICD-10-CM

## 2022-02-01 DIAGNOSIS — H72.91 PERFORATION OF RIGHT TYMPANIC MEMBRANE: Primary | ICD-10-CM

## 2022-02-01 PROCEDURE — 3008F BODY MASS INDEX DOCD: CPT | Performed by: OTOLARYNGOLOGY

## 2022-02-01 PROCEDURE — 92504 EAR MICROSCOPY EXAMINATION: CPT | Performed by: OTOLARYNGOLOGY

## 2022-02-01 PROCEDURE — 99213 OFFICE O/P EST LOW 20 MIN: CPT | Performed by: OTOLARYNGOLOGY

## 2022-02-13 ENCOUNTER — PATIENT MESSAGE (OUTPATIENT)
Dept: FAMILY MEDICINE CLINIC | Facility: CLINIC | Age: 57
End: 2022-02-13

## 2022-02-22 ENCOUNTER — OFFICE VISIT (OUTPATIENT)
Dept: FAMILY MEDICINE CLINIC | Facility: CLINIC | Age: 57
End: 2022-02-22
Payer: COMMERCIAL

## 2022-02-22 VITALS
DIASTOLIC BLOOD PRESSURE: 76 MMHG | HEART RATE: 69 BPM | BODY MASS INDEX: 27.47 KG/M2 | HEIGHT: 67 IN | WEIGHT: 175 LBS | SYSTOLIC BLOOD PRESSURE: 127 MMHG

## 2022-02-22 DIAGNOSIS — G47.9 SLEEP DISTURBANCES: ICD-10-CM

## 2022-02-22 DIAGNOSIS — R10.2 PELVIC PAIN: ICD-10-CM

## 2022-02-22 DIAGNOSIS — R35.0 FREQUENT URINATION: Primary | ICD-10-CM

## 2022-02-22 LAB
APPEARANCE: CLEAR
GLUCOSE (URINE DIPSTICK): NEGATIVE MG/DL
KETONES (URINE DIPSTICK): NEGATIVE MG/DL
MULTISTIX LOT#: ABNORMAL NUMERIC
NITRITE, URINE: NEGATIVE
PH, URINE: 5 (ref 4.5–8)
PROTEIN (URINE DIPSTICK): NEGATIVE MG/DL
SPECIFIC GRAVITY: 1.01 (ref 1–1.03)
URINE-COLOR: YELLOW
UROBILINOGEN,SEMI-QN: 0.2 MG/DL (ref 0–1.9)

## 2022-02-22 PROCEDURE — 99214 OFFICE O/P EST MOD 30 MIN: CPT | Performed by: FAMILY MEDICINE

## 2022-02-22 PROCEDURE — 81002 URINALYSIS NONAUTO W/O SCOPE: CPT | Performed by: FAMILY MEDICINE

## 2022-02-22 PROCEDURE — 3078F DIAST BP <80 MM HG: CPT | Performed by: FAMILY MEDICINE

## 2022-02-22 PROCEDURE — 3008F BODY MASS INDEX DOCD: CPT | Performed by: FAMILY MEDICINE

## 2022-02-22 PROCEDURE — 3074F SYST BP LT 130 MM HG: CPT | Performed by: FAMILY MEDICINE

## 2022-02-22 RX ORDER — ZOLPIDEM TARTRATE 10 MG/1
10 TABLET ORAL NIGHTLY PRN
Qty: 30 TABLET | Refills: 2 | Status: SHIPPED | OUTPATIENT
Start: 2022-02-22 | End: 2023-02-17

## 2022-04-25 ENCOUNTER — OFFICE VISIT (OUTPATIENT)
Dept: FAMILY MEDICINE CLINIC | Facility: CLINIC | Age: 57
End: 2022-04-25
Payer: COMMERCIAL

## 2022-04-25 ENCOUNTER — NURSE TRIAGE (OUTPATIENT)
Dept: FAMILY MEDICINE CLINIC | Facility: CLINIC | Age: 57
End: 2022-04-25

## 2022-04-25 VITALS
HEART RATE: 84 BPM | SYSTOLIC BLOOD PRESSURE: 119 MMHG | WEIGHT: 174 LBS | BODY MASS INDEX: 27.31 KG/M2 | HEIGHT: 67 IN | DIASTOLIC BLOOD PRESSURE: 83 MMHG

## 2022-04-25 DIAGNOSIS — K62.5 RECTAL BLEEDING: Primary | ICD-10-CM

## 2022-04-25 DIAGNOSIS — R42 DIZZINESS: ICD-10-CM

## 2022-04-25 PROBLEM — K64.4 HEMORRHOIDS, EXTERNAL: Status: ACTIVE | Noted: 2022-04-25

## 2022-04-25 PROCEDURE — 3079F DIAST BP 80-89 MM HG: CPT

## 2022-04-25 PROCEDURE — 3008F BODY MASS INDEX DOCD: CPT

## 2022-04-25 PROCEDURE — 3074F SYST BP LT 130 MM HG: CPT

## 2022-04-25 PROCEDURE — 99213 OFFICE O/P EST LOW 20 MIN: CPT

## 2022-04-25 RX ORDER — HYDROCORTISONE 25 MG/G
CREAM TOPICAL
Qty: 28 G | Refills: 0 | Status: SHIPPED | OUTPATIENT
Start: 2022-04-25

## 2022-05-03 ENCOUNTER — PATIENT MESSAGE (OUTPATIENT)
Dept: FAMILY MEDICINE CLINIC | Facility: CLINIC | Age: 57
End: 2022-05-03

## 2022-05-10 ENCOUNTER — OFFICE VISIT (OUTPATIENT)
Dept: FAMILY MEDICINE CLINIC | Facility: CLINIC | Age: 57
End: 2022-05-10
Payer: COMMERCIAL

## 2022-05-10 VITALS
BODY MASS INDEX: 27.25 KG/M2 | HEIGHT: 67 IN | DIASTOLIC BLOOD PRESSURE: 78 MMHG | SYSTOLIC BLOOD PRESSURE: 119 MMHG | WEIGHT: 173.63 LBS | HEART RATE: 66 BPM

## 2022-05-10 DIAGNOSIS — Z00.00 ROUTINE GENERAL MEDICAL EXAMINATION AT A HEALTH CARE FACILITY: Primary | ICD-10-CM

## 2022-05-10 DIAGNOSIS — I10 ESSENTIAL HYPERTENSION: ICD-10-CM

## 2022-05-10 DIAGNOSIS — G47.9 SLEEP DISTURBANCES: ICD-10-CM

## 2022-05-10 DIAGNOSIS — R10.2 PELVIC PAIN: ICD-10-CM

## 2022-05-10 DIAGNOSIS — I95.1 ORTHOSTATIC HYPOTENSION: ICD-10-CM

## 2022-05-10 PROCEDURE — 99396 PREV VISIT EST AGE 40-64: CPT | Performed by: FAMILY MEDICINE

## 2022-05-10 PROCEDURE — 3074F SYST BP LT 130 MM HG: CPT | Performed by: FAMILY MEDICINE

## 2022-05-10 PROCEDURE — 3008F BODY MASS INDEX DOCD: CPT | Performed by: FAMILY MEDICINE

## 2022-05-10 PROCEDURE — 3078F DIAST BP <80 MM HG: CPT | Performed by: FAMILY MEDICINE

## 2022-05-11 ENCOUNTER — HOSPITAL ENCOUNTER (OUTPATIENT)
Dept: ULTRASOUND IMAGING | Age: 57
Discharge: HOME | End: 2022-05-11
Attending: FAMILY MEDICINE
Payer: COMMERCIAL

## 2022-05-11 DIAGNOSIS — R10.2 PELVIC PAIN: ICD-10-CM

## 2022-05-11 PROCEDURE — 93975 VASCULAR STUDY: CPT | Performed by: FAMILY MEDICINE

## 2022-05-11 PROCEDURE — 76856 US EXAM PELVIC COMPLETE: CPT | Performed by: FAMILY MEDICINE

## 2022-06-29 ENCOUNTER — LAB ENCOUNTER (OUTPATIENT)
Dept: LAB | Facility: HOSPITAL | Age: 57
End: 2022-06-29
Attending: FAMILY MEDICINE
Payer: COMMERCIAL

## 2022-06-29 DIAGNOSIS — Z00.00 ROUTINE GENERAL MEDICAL EXAMINATION AT A HEALTH CARE FACILITY: ICD-10-CM

## 2022-06-29 LAB
ALBUMIN SERPL-MCNC: 3.8 G/DL (ref 3.4–5)
ALBUMIN/GLOB SERPL: 1.2 {RATIO} (ref 1–2)
ALP LIVER SERPL-CCNC: 100 U/L
ALT SERPL-CCNC: 44 U/L
ANION GAP SERPL CALC-SCNC: 4 MMOL/L (ref 0–18)
AST SERPL-CCNC: 21 U/L (ref 15–37)
BASOPHILS # BLD AUTO: 0.06 X10(3) UL (ref 0–0.2)
BASOPHILS NFR BLD AUTO: 1.1 %
BILIRUB SERPL-MCNC: 0.8 MG/DL (ref 0.1–2)
BILIRUB UR QL: NEGATIVE
BUN BLD-MCNC: 20 MG/DL (ref 7–18)
BUN/CREAT SERPL: 25.6 (ref 10–20)
CALCIUM BLD-MCNC: 9.5 MG/DL (ref 8.5–10.1)
CHLORIDE SERPL-SCNC: 107 MMOL/L (ref 98–112)
CHOLEST SERPL-MCNC: 208 MG/DL (ref ?–200)
CLARITY UR: CLEAR
CO2 SERPL-SCNC: 28 MMOL/L (ref 21–32)
COLOR UR: YELLOW
CREAT BLD-MCNC: 0.78 MG/DL
DEPRECATED RDW RBC AUTO: 41.1 FL (ref 35.1–46.3)
EOSINOPHIL # BLD AUTO: 0.28 X10(3) UL (ref 0–0.7)
EOSINOPHIL NFR BLD AUTO: 5.3 %
ERYTHROCYTE [DISTWIDTH] IN BLOOD BY AUTOMATED COUNT: 13.2 % (ref 11–15)
FASTING PATIENT LIPID ANSWER: YES
FASTING STATUS PATIENT QL REPORTED: YES
GLOBULIN PLAS-MCNC: 3.3 G/DL (ref 2.8–4.4)
GLUCOSE BLD-MCNC: 94 MG/DL (ref 70–99)
GLUCOSE UR-MCNC: NEGATIVE MG/DL
HCT VFR BLD AUTO: 43.3 %
HDLC SERPL-MCNC: 58 MG/DL (ref 40–59)
HGB BLD-MCNC: 13.6 G/DL
IMM GRANULOCYTES # BLD AUTO: 0.01 X10(3) UL (ref 0–1)
IMM GRANULOCYTES NFR BLD: 0.2 %
KETONES UR-MCNC: NEGATIVE MG/DL
LDLC SERPL CALC-MCNC: 131 MG/DL (ref ?–100)
LYMPHOCYTES # BLD AUTO: 1.78 X10(3) UL (ref 1–4)
LYMPHOCYTES NFR BLD AUTO: 33.6 %
MCH RBC QN AUTO: 26.5 PG (ref 26–34)
MCHC RBC AUTO-ENTMCNC: 31.4 G/DL (ref 31–37)
MCV RBC AUTO: 84.2 FL
MONOCYTES # BLD AUTO: 0.68 X10(3) UL (ref 0.1–1)
MONOCYTES NFR BLD AUTO: 12.8 %
NEUTROPHILS # BLD AUTO: 2.49 X10 (3) UL (ref 1.5–7.7)
NEUTROPHILS # BLD AUTO: 2.49 X10(3) UL (ref 1.5–7.7)
NEUTROPHILS NFR BLD AUTO: 47 %
NITRITE UR QL STRIP.AUTO: NEGATIVE
NONHDLC SERPL-MCNC: 150 MG/DL (ref ?–130)
OSMOLALITY SERPL CALC.SUM OF ELEC: 290 MOSM/KG (ref 275–295)
PH UR: 5.5 [PH] (ref 5–8)
PLATELET # BLD AUTO: 262 10(3)UL (ref 150–450)
POTASSIUM SERPL-SCNC: 4 MMOL/L (ref 3.5–5.1)
PROT SERPL-MCNC: 7.1 G/DL (ref 6.4–8.2)
PROT UR-MCNC: NEGATIVE MG/DL
RBC # BLD AUTO: 5.14 X10(6)UL
SODIUM SERPL-SCNC: 139 MMOL/L (ref 136–145)
SP GR UR STRIP: 1.02 (ref 1–1.03)
TRIGL SERPL-MCNC: 109 MG/DL (ref 30–149)
UROBILINOGEN UR STRIP-ACNC: 0.2
VIT D+METAB SERPL-MCNC: 30.3 NG/ML (ref 30–100)
VLDLC SERPL CALC-MCNC: 20 MG/DL (ref 0–30)
WBC # BLD AUTO: 5.3 X10(3) UL (ref 4–11)

## 2022-06-29 PROCEDURE — 81001 URINALYSIS AUTO W/SCOPE: CPT

## 2022-06-29 PROCEDURE — 80061 LIPID PANEL: CPT

## 2022-06-29 PROCEDURE — 80053 COMPREHEN METABOLIC PANEL: CPT

## 2022-06-29 PROCEDURE — 82306 VITAMIN D 25 HYDROXY: CPT

## 2022-06-29 PROCEDURE — 36415 COLL VENOUS BLD VENIPUNCTURE: CPT

## 2022-06-29 PROCEDURE — 85025 COMPLETE CBC W/AUTO DIFF WBC: CPT

## 2022-06-30 ENCOUNTER — OFFICE VISIT (OUTPATIENT)
Dept: OTOLARYNGOLOGY | Facility: CLINIC | Age: 57
End: 2022-06-30
Payer: COMMERCIAL

## 2022-06-30 VITALS — WEIGHT: 173 LBS | HEIGHT: 67 IN | BODY MASS INDEX: 27.15 KG/M2

## 2022-06-30 DIAGNOSIS — H61.23 BILATERAL IMPACTED CERUMEN: Primary | ICD-10-CM

## 2022-06-30 PROCEDURE — 3008F BODY MASS INDEX DOCD: CPT | Performed by: OTOLARYNGOLOGY

## 2022-07-18 RX ORDER — METOPROLOL SUCCINATE 50 MG/1
TABLET, EXTENDED RELEASE ORAL
Qty: 90 TABLET | Refills: 3 | Status: SHIPPED | OUTPATIENT
Start: 2022-07-18

## 2022-10-26 ENCOUNTER — OFFICE VISIT (OUTPATIENT)
Dept: FAMILY MEDICINE CLINIC | Facility: CLINIC | Age: 57
End: 2022-10-26
Payer: COMMERCIAL

## 2022-10-26 VITALS
BODY MASS INDEX: 25.74 KG/M2 | HEART RATE: 80 BPM | OXYGEN SATURATION: 97 % | WEIGHT: 164 LBS | RESPIRATION RATE: 16 BRPM | HEIGHT: 67 IN | SYSTOLIC BLOOD PRESSURE: 111 MMHG | TEMPERATURE: 98 F | DIASTOLIC BLOOD PRESSURE: 78 MMHG

## 2022-10-26 DIAGNOSIS — L30.9 HAND ECZEMA: Primary | ICD-10-CM

## 2022-10-26 PROBLEM — E66.3 OVERWEIGHT: Status: ACTIVE | Noted: 2021-07-22

## 2022-10-26 PROBLEM — D24.9 FIBROADENOMA OF BREAST: Status: ACTIVE | Noted: 2021-07-22

## 2022-10-26 PROCEDURE — 3078F DIAST BP <80 MM HG: CPT

## 2022-10-26 PROCEDURE — 3074F SYST BP LT 130 MM HG: CPT

## 2022-10-26 PROCEDURE — 99213 OFFICE O/P EST LOW 20 MIN: CPT

## 2022-10-26 PROCEDURE — 3008F BODY MASS INDEX DOCD: CPT

## 2022-10-26 RX ORDER — TRIAMCINOLONE ACETONIDE 1 MG/G
CREAM TOPICAL
Qty: 60 G | Refills: 1 | Status: SHIPPED | OUTPATIENT
Start: 2022-10-26

## 2022-12-14 ENCOUNTER — WALK IN (OUTPATIENT)
Dept: URGENT CARE | Age: 57
End: 2022-12-14

## 2022-12-14 VITALS
BODY MASS INDEX: 26.68 KG/M2 | TEMPERATURE: 98.7 F | RESPIRATION RATE: 16 BRPM | HEART RATE: 88 BPM | HEIGHT: 67 IN | WEIGHT: 170 LBS

## 2022-12-14 DIAGNOSIS — B96.89 ACUTE BACTERIAL SINUSITIS: Primary | ICD-10-CM

## 2022-12-14 DIAGNOSIS — J01.90 ACUTE BACTERIAL SINUSITIS: Primary | ICD-10-CM

## 2022-12-14 PROCEDURE — 99203 OFFICE O/P NEW LOW 30 MIN: CPT | Performed by: NURSE PRACTITIONER

## 2022-12-14 RX ORDER — AZITHROMYCIN 250 MG/1
TABLET, FILM COATED ORAL
Qty: 6 TABLET | Refills: 0 | Status: SHIPPED | OUTPATIENT
Start: 2022-12-14

## 2022-12-14 RX ORDER — AMLODIPINE BESYLATE 10 MG/1
TABLET ORAL
COMMUNITY

## 2022-12-14 RX ORDER — TRIAMCINOLONE ACETONIDE 1 MG/G
CREAM TOPICAL
COMMUNITY
Start: 2022-10-26

## 2022-12-14 RX ORDER — METOPROLOL SUCCINATE 50 MG/1
TABLET, EXTENDED RELEASE ORAL
COMMUNITY
Start: 2022-07-18

## 2022-12-14 RX ORDER — AMITRIPTYLINE HYDROCHLORIDE 50 MG/1
TABLET, FILM COATED ORAL
COMMUNITY

## 2022-12-14 RX ORDER — ZOLPIDEM TARTRATE 10 MG/1
10 TABLET ORAL
COMMUNITY
Start: 2022-02-22 | End: 2023-02-17

## 2022-12-14 ASSESSMENT — ENCOUNTER SYMPTOMS
FATIGUE: 1
SINUS PAIN: 0
SORE THROAT: 0
DIZZINESS: 0
ALLERGIC/IMMUNOLOGIC NEGATIVE: 1
LIGHT-HEADEDNESS: 0
EYES NEGATIVE: 1
GASTROINTESTINAL NEGATIVE: 1
CHILLS: 1
COUGH: 1
WHEEZING: 0
HEADACHES: 1
SWOLLEN GLANDS: 0
SHORTNESS OF BREATH: 0
SINUS PRESSURE: 0
RHINORRHEA: 0
FEVER: 0

## 2023-01-04 ENCOUNTER — HOSPITAL ENCOUNTER (OUTPATIENT)
Dept: MAMMOGRAPHY | Age: 58
Discharge: HOME | End: 2023-01-04
Attending: OBSTETRICS & GYNECOLOGY
Payer: COMMERCIAL

## 2023-01-04 DIAGNOSIS — Z12.31 ENCOUNTER FOR SCREENING MAMMOGRAM FOR MALIGNANT NEOPLASM OF BREAST: ICD-10-CM

## 2023-01-04 PROCEDURE — 77063 BREAST TOMOSYNTHESIS BI: CPT | Performed by: OBSTETRICS & GYNECOLOGY

## 2023-01-04 PROCEDURE — 77067 SCR MAMMO BI INCL CAD: CPT | Performed by: OBSTETRICS & GYNECOLOGY

## 2023-01-26 ENCOUNTER — OFFICE VISIT (OUTPATIENT)
Dept: OTOLARYNGOLOGY | Facility: CLINIC | Age: 58
End: 2023-01-26

## 2023-01-26 ENCOUNTER — PATIENT MESSAGE (OUTPATIENT)
Dept: OTOLARYNGOLOGY | Facility: CLINIC | Age: 58
End: 2023-01-26

## 2023-01-26 VITALS — BODY MASS INDEX: 27 KG/M2 | WEIGHT: 170 LBS

## 2023-01-26 DIAGNOSIS — J01.90 ACUTE NON-RECURRENT SINUSITIS, UNSPECIFIED LOCATION: Primary | ICD-10-CM

## 2023-01-26 DIAGNOSIS — H92.13 OTORRHEA OF BOTH EARS: ICD-10-CM

## 2023-01-26 PROCEDURE — 99213 OFFICE O/P EST LOW 20 MIN: CPT | Performed by: OTOLARYNGOLOGY

## 2023-01-26 PROCEDURE — 92504 EAR MICROSCOPY EXAMINATION: CPT | Performed by: OTOLARYNGOLOGY

## 2023-01-26 RX ORDER — LORATADINE 10 MG/1
10 TABLET ORAL DAILY
Qty: 30 TABLET | Refills: 3 | Status: SHIPPED | OUTPATIENT
Start: 2023-01-26

## 2023-01-26 RX ORDER — MONTELUKAST SODIUM 10 MG/1
10 TABLET ORAL NIGHTLY
Qty: 30 TABLET | Refills: 3 | Status: SHIPPED | OUTPATIENT
Start: 2023-01-26

## 2023-01-26 RX ORDER — TOBRAMYCIN AND DEXAMETHASONE 3; 1 MG/ML; MG/ML
3 SUSPENSION/ DROPS OPHTHALMIC EVERY 8 HOURS
Qty: 10 ML | Refills: 1 | Status: SHIPPED | OUTPATIENT
Start: 2023-01-26

## 2023-01-26 RX ORDER — AZELASTINE 1 MG/ML
2 SPRAY, METERED NASAL 2 TIMES DAILY
Qty: 30 ML | Refills: 0 | Status: SHIPPED | OUTPATIENT
Start: 2023-01-26

## 2023-02-01 ENCOUNTER — TELEPHONE (OUTPATIENT)
Dept: OTOLARYNGOLOGY | Facility: CLINIC | Age: 58
End: 2023-02-01

## 2023-02-01 RX ORDER — OFLOXACIN 3 MG/ML
3 SOLUTION AURICULAR (OTIC) 3 TIMES DAILY
Qty: 10 ML | Refills: 0 | Status: SHIPPED | OUTPATIENT
Start: 2023-02-01

## 2023-02-01 NOTE — TELEPHONE ENCOUNTER
Please call in ofloxacin eardrops 3 drops to be used in the affected areas 3 times a day. Please let her know that I called in TobraDex drops because they have a steroid that would help with the inflammation that I noted on her eardrums and she may not get the same response with plain ofloxacin.   I will leave it up to her to decide which drops she uses

## 2023-02-01 NOTE — TELEPHONE ENCOUNTER
Per pt has been waiting since last week for ofloxacin ear drops. Per pt please send asap.  Please advise

## 2023-03-31 ENCOUNTER — TELEPHONE (OUTPATIENT)
Facility: CLINIC | Age: 58
End: 2023-03-31

## 2023-03-31 NOTE — TELEPHONE ENCOUNTER
Dr Woodward Led    The patient is requesting for an OV for rectal bleeding (Ali's mother). Please advise. Thank you.

## 2023-03-31 NOTE — TELEPHONE ENCOUNTER
Called and spoke to the patient,  and name verified. OV scheduled on 4/10/2023 at 11:00. The patient verbalized correct date,time and location of the appointment.

## 2023-04-03 NOTE — TELEPHONE ENCOUNTER
Your appointments     Date & Time Appointment Department [facility])    Apr 10, 2023 11:00 AM CDT Follow Up Visit with London Meza MD [address], [address] [facility], [place] ([facility])            [address] [facility], [address]  [address] [place] [address],[address]  [phone]

## 2023-04-10 ENCOUNTER — OFFICE VISIT (OUTPATIENT)
Facility: CLINIC | Age: 58
End: 2023-04-10

## 2023-04-10 ENCOUNTER — TELEPHONE (OUTPATIENT)
Facility: CLINIC | Age: 58
End: 2023-04-10

## 2023-04-10 VITALS
HEIGHT: 67 IN | SYSTOLIC BLOOD PRESSURE: 118 MMHG | BODY MASS INDEX: 27.78 KG/M2 | HEART RATE: 76 BPM | WEIGHT: 177 LBS | DIASTOLIC BLOOD PRESSURE: 78 MMHG

## 2023-04-10 DIAGNOSIS — R13.19 ESOPHAGEAL DYSPHAGIA: ICD-10-CM

## 2023-04-10 DIAGNOSIS — Z12.11 SCREENING FOR COLON CANCER: Primary | ICD-10-CM

## 2023-04-10 DIAGNOSIS — K21.9 GASTROESOPHAGEAL REFLUX DISEASE, UNSPECIFIED WHETHER ESOPHAGITIS PRESENT: ICD-10-CM

## 2023-04-10 DIAGNOSIS — K62.5 RECTAL BLEEDING: ICD-10-CM

## 2023-04-10 DIAGNOSIS — R13.10 DYSPHAGIA, UNSPECIFIED TYPE: ICD-10-CM

## 2023-04-10 DIAGNOSIS — Z80.0 FAMILY HISTORY OF ESOPHAGEAL CANCER: ICD-10-CM

## 2023-04-10 DIAGNOSIS — K62.5 RECTAL BLEEDING: Primary | ICD-10-CM

## 2023-04-10 PROCEDURE — 3008F BODY MASS INDEX DOCD: CPT | Performed by: INTERNAL MEDICINE

## 2023-04-10 PROCEDURE — 3074F SYST BP LT 130 MM HG: CPT | Performed by: INTERNAL MEDICINE

## 2023-04-10 PROCEDURE — 3078F DIAST BP <80 MM HG: CPT | Performed by: INTERNAL MEDICINE

## 2023-04-10 PROCEDURE — 99203 OFFICE O/P NEW LOW 30 MIN: CPT | Performed by: INTERNAL MEDICINE

## 2023-04-10 RX ORDER — SODIUM, POTASSIUM,MAG SULFATES 17.5-3.13G
SOLUTION, RECONSTITUTED, ORAL ORAL
Qty: 1 EACH | Refills: 0 | Status: SHIPPED | OUTPATIENT
Start: 2023-04-10

## 2023-04-10 RX ORDER — ZOLPIDEM TARTRATE 5 MG/1
TABLET ORAL
COMMUNITY
Start: 2022-04-04

## 2023-04-10 NOTE — TELEPHONE ENCOUNTER
Scheduled for:  Colonoscopy 68874, 93763, EGD w/ esophageal Dilation 08968  Provider Name:    Date: 6/22/2023   Location:  [facility]  Sedation:  MAC  Time:  10:15 am, (pt is aware to arrive at 9:15 am)  Prep:  Suprep  Meds/Allergies Reconciled?: Physician reviewed   Diagnosis with codes: Screening for Colon Cancer Z12.11, Rectal Bleeding K62.5, Dysphagia R13.10, Family History of Esophageal Cancer Z80.0  Was patient informed to call insurance with codes (Y/N):  Yes  Referral sent?:  Referral was sent at the time of electronic surgical scheduling. [facility] or [address] notified?:   I sent an electronic request to Endo Scheduling and received a confirmation today. Medication Orders:  N/A  Misc Orders: N/A      Further instructions given by staff:  I discussed the prep instructions with the patient which she verbally understood. Provided patient with prep instructions at the time of office visit.

## 2023-04-10 NOTE — PATIENT INSTRUCTIONS
Schedule colonoscopy for screening and rectal bleeding following a split dose Suprep and monitored anesthesia care. Schedule concurrent upper endoscopy and possible esophageal dilatation for dysphagia, reflux and a family history of esophageal cancer.

## 2023-04-28 ENCOUNTER — OFFICE VISIT (OUTPATIENT)
Dept: OTOLARYNGOLOGY | Facility: CLINIC | Age: 58
End: 2023-04-28

## 2023-04-28 VITALS — BODY MASS INDEX: 28 KG/M2 | WEIGHT: 177 LBS

## 2023-04-28 DIAGNOSIS — H61.23 BILATERAL IMPACTED CERUMEN: Primary | ICD-10-CM

## 2023-04-28 PROCEDURE — 69210 REMOVE IMPACTED EAR WAX UNI: CPT | Performed by: OTOLARYNGOLOGY

## 2023-06-08 ENCOUNTER — OFFICE VISIT (OUTPATIENT)
Dept: FAMILY MEDICINE CLINIC | Facility: CLINIC | Age: 58
End: 2023-06-08

## 2023-06-08 VITALS
WEIGHT: 177.19 LBS | OXYGEN SATURATION: 94 % | BODY MASS INDEX: 27.81 KG/M2 | HEIGHT: 67 IN | HEART RATE: 69 BPM | SYSTOLIC BLOOD PRESSURE: 117 MMHG | DIASTOLIC BLOOD PRESSURE: 79 MMHG

## 2023-06-08 DIAGNOSIS — K62.5 RECTAL BLEEDING: ICD-10-CM

## 2023-06-08 DIAGNOSIS — H90.2 CONDUCTIVE HEARING LOSS, MIDDLE EAR: ICD-10-CM

## 2023-06-08 DIAGNOSIS — I10 ESSENTIAL HYPERTENSION: ICD-10-CM

## 2023-06-08 DIAGNOSIS — Z00.00 ROUTINE GENERAL MEDICAL EXAMINATION AT A HEALTH CARE FACILITY: Primary | ICD-10-CM

## 2023-06-08 DIAGNOSIS — L30.9 HAND ECZEMA: ICD-10-CM

## 2023-06-08 DIAGNOSIS — Z01.419 WOMEN'S ANNUAL ROUTINE GYNECOLOGICAL EXAMINATION: ICD-10-CM

## 2023-06-08 PROBLEM — R42 DIZZINESS: Status: RESOLVED | Noted: 2022-04-25 | Resolved: 2023-06-08

## 2023-06-08 PROBLEM — E66.3 OVERWEIGHT: Status: RESOLVED | Noted: 2021-07-22 | Resolved: 2023-06-08

## 2023-06-08 PROBLEM — L57.8 SUN-DAMAGED SKIN: Status: RESOLVED | Noted: 2020-06-11 | Resolved: 2023-06-08

## 2023-06-08 PROCEDURE — 3074F SYST BP LT 130 MM HG: CPT | Performed by: FAMILY MEDICINE

## 2023-06-08 PROCEDURE — 3078F DIAST BP <80 MM HG: CPT | Performed by: FAMILY MEDICINE

## 2023-06-08 PROCEDURE — 3008F BODY MASS INDEX DOCD: CPT | Performed by: FAMILY MEDICINE

## 2023-06-08 PROCEDURE — 99396 PREV VISIT EST AGE 40-64: CPT | Performed by: FAMILY MEDICINE

## 2023-06-08 RX ORDER — ZOLPIDEM TARTRATE 5 MG/1
5 TABLET ORAL NIGHTLY PRN
Qty: 30 TABLET | Refills: 5 | Status: SHIPPED | OUTPATIENT
Start: 2023-06-08

## 2023-06-08 RX ORDER — METOPROLOL SUCCINATE 50 MG/1
50 TABLET, EXTENDED RELEASE ORAL DAILY
Qty: 90 TABLET | Refills: 3 | Status: SHIPPED | OUTPATIENT
Start: 2023-06-08

## 2023-06-22 ENCOUNTER — ANESTHESIA EVENT (OUTPATIENT)
Dept: ENDOSCOPY | Facility: HOSPITAL | Age: 58
End: 2023-06-22
Payer: COMMERCIAL

## 2023-06-22 ENCOUNTER — TELEPHONE (OUTPATIENT)
Facility: CLINIC | Age: 58
End: 2023-06-22

## 2023-06-22 ENCOUNTER — HOSPITAL ENCOUNTER (OUTPATIENT)
Facility: HOSPITAL | Age: 58
Setting detail: HOSPITAL OUTPATIENT SURGERY
Discharge: HOME | End: 2023-06-22
Attending: INTERNAL MEDICINE | Admitting: INTERNAL MEDICINE
Payer: COMMERCIAL

## 2023-06-22 ENCOUNTER — ANESTHESIA (OUTPATIENT)
Dept: ENDOSCOPY | Facility: HOSPITAL | Age: 58
End: 2023-06-22
Payer: COMMERCIAL

## 2023-06-22 DIAGNOSIS — K21.9 GASTROESOPHAGEAL REFLUX DISEASE, UNSPECIFIED WHETHER ESOPHAGITIS PRESENT: ICD-10-CM

## 2023-06-22 DIAGNOSIS — K31.7 GASTRIC POLYP: ICD-10-CM

## 2023-06-22 DIAGNOSIS — K62.5 RECTAL BLEEDING: ICD-10-CM

## 2023-06-22 DIAGNOSIS — K22.2 ESOPHAGEAL STRICTURE: ICD-10-CM

## 2023-06-22 DIAGNOSIS — Z80.0 FAMILY HISTORY OF ESOPHAGEAL CANCER: ICD-10-CM

## 2023-06-22 DIAGNOSIS — K44.9 HIATAL HERNIA: ICD-10-CM

## 2023-06-22 DIAGNOSIS — Z12.11 SCREENING FOR COLON CANCER: ICD-10-CM

## 2023-06-22 DIAGNOSIS — R10.9 ABDOMINAL PAIN, UNSPECIFIED ABDOMINAL LOCATION: Primary | ICD-10-CM

## 2023-06-22 DIAGNOSIS — K63.5 COLON POLYPS: Primary | ICD-10-CM

## 2023-06-22 DIAGNOSIS — K64.8 INTERNAL HEMORRHOIDS: ICD-10-CM

## 2023-06-22 DIAGNOSIS — K57.90 DIVERTICULOSIS: ICD-10-CM

## 2023-06-22 DIAGNOSIS — R13.10 DYSPHAGIA, UNSPECIFIED TYPE: ICD-10-CM

## 2023-06-22 PROCEDURE — 43239 EGD BIOPSY SINGLE/MULTIPLE: CPT | Performed by: INTERNAL MEDICINE

## 2023-06-22 PROCEDURE — 43249 ESOPH EGD DILATION <30 MM: CPT | Performed by: INTERNAL MEDICINE

## 2023-06-22 PROCEDURE — 0D748ZZ DILATION OF ESOPHAGOGASTRIC JUNCTION, VIA NATURAL OR ARTIFICIAL OPENING ENDOSCOPIC: ICD-10-PCS | Performed by: INTERNAL MEDICINE

## 2023-06-22 PROCEDURE — 0DB78ZX EXCISION OF STOMACH, PYLORUS, VIA NATURAL OR ARTIFICIAL OPENING ENDOSCOPIC, DIAGNOSTIC: ICD-10-PCS | Performed by: INTERNAL MEDICINE

## 2023-06-22 PROCEDURE — 45385 COLONOSCOPY W/LESION REMOVAL: CPT | Performed by: INTERNAL MEDICINE

## 2023-06-22 PROCEDURE — 0DBE8ZX EXCISION OF LARGE INTESTINE, VIA NATURAL OR ARTIFICIAL OPENING ENDOSCOPIC, DIAGNOSTIC: ICD-10-PCS | Performed by: INTERNAL MEDICINE

## 2023-06-22 PROCEDURE — 0DB18ZX EXCISION OF UPPER ESOPHAGUS, VIA NATURAL OR ARTIFICIAL OPENING ENDOSCOPIC, DIAGNOSTIC: ICD-10-PCS | Performed by: INTERNAL MEDICINE

## 2023-06-22 PROCEDURE — 0DBN8ZX EXCISION OF SIGMOID COLON, VIA NATURAL OR ARTIFICIAL OPENING ENDOSCOPIC, DIAGNOSTIC: ICD-10-PCS | Performed by: INTERNAL MEDICINE

## 2023-06-22 PROCEDURE — 0DB38ZX EXCISION OF LOWER ESOPHAGUS, VIA NATURAL OR ARTIFICIAL OPENING ENDOSCOPIC, DIAGNOSTIC: ICD-10-PCS | Performed by: INTERNAL MEDICINE

## 2023-06-22 RX ORDER — LIDOCAINE HYDROCHLORIDE 10 MG/ML
INJECTION, SOLUTION EPIDURAL; INFILTRATION; INTRACAUDAL; PERINEURAL AS NEEDED
Status: DISCONTINUED | OUTPATIENT
Start: 2023-06-22 | End: 2023-06-22 | Stop reason: SURG

## 2023-06-22 RX ORDER — SODIUM CHLORIDE, SODIUM LACTATE, POTASSIUM CHLORIDE, CALCIUM CHLORIDE 600; 310; 30; 20 MG/100ML; MG/100ML; MG/100ML; MG/100ML
INJECTION, SOLUTION INTRAVENOUS CONTINUOUS
OUTPATIENT
Start: 2023-06-22

## 2023-06-22 RX ORDER — NALOXONE HYDROCHLORIDE 0.4 MG/ML
80 INJECTION, SOLUTION INTRAMUSCULAR; INTRAVENOUS; SUBCUTANEOUS AS NEEDED
OUTPATIENT
Start: 2023-06-22 | End: 2023-06-22

## 2023-06-22 RX ORDER — SODIUM CHLORIDE, SODIUM LACTATE, POTASSIUM CHLORIDE, CALCIUM CHLORIDE 600; 310; 30; 20 MG/100ML; MG/100ML; MG/100ML; MG/100ML
INJECTION, SOLUTION INTRAVENOUS CONTINUOUS
Status: DISCONTINUED | OUTPATIENT
Start: 2023-06-22 | End: 2023-06-22

## 2023-06-22 RX ADMIN — SODIUM CHLORIDE, SODIUM LACTATE, POTASSIUM CHLORIDE, CALCIUM CHLORIDE: 600; 310; 30; 20 INJECTION, SOLUTION INTRAVENOUS at 08:15:00

## 2023-06-22 RX ADMIN — SODIUM CHLORIDE, SODIUM LACTATE, POTASSIUM CHLORIDE, CALCIUM CHLORIDE: 600; 310; 30; 20 INJECTION, SOLUTION INTRAVENOUS at 07:34:00

## 2023-06-22 RX ADMIN — LIDOCAINE HYDROCHLORIDE 50 MG: 10 INJECTION, SOLUTION EPIDURAL; INFILTRATION; INTRACAUDAL; PERINEURAL at 07:38:00

## 2023-06-22 NOTE — TELEPHONE ENCOUNTER
GI RNs: schedulers: Please add an EGD onto the patient's upcoming colonoscopy for epigastric abdominal pain and reflux.

## 2023-06-22 NOTE — TELEPHONE ENCOUNTER
PPD-    Please add EGD 71450 to patient's colonoscopy on 6/22 at [facility] for epigastric abdominal pain R10.13 with Dr. Windy Senior.

## 2023-06-22 NOTE — DISCHARGE INSTRUCTIONS

## 2023-06-22 NOTE — TELEPHONE ENCOUNTER
See referral number [id number], Per [employer] No pa required for codes [phone], [phone], [id number] at [address] [place] [address]. Referral note updated.

## 2023-06-22 NOTE — OPERATIVE REPORT
[facility] Endoscopy Report      Date of Procedure:  06/22/23      Preoperative Diagnosis:  1. Colorectal cancer screening  2. Rectal bleeding  3. Intermittent solid food dysphagia      Postoperative Diagnosis:  1. Sigmoid colon polyps likely inflammatory  2. Sigmoid colon diverticulosis with segmental colitis associated with diverticulosis (SCAD)  3. Internal hemorrhoids  4. Esophageal stricture  5.  5 cm hiatal hernia  6. Diminutive gastric polyps      Procedure:    Colonoscopy with biopsy  Esophagogastroduodenoscopy with biopsy and TTS balloon dilatation      Surgeon:  Dallas Cameron M.D. Anesthesia:  Monitored anesthesia care  Cecal withdrawal time: 20 minutes  EBL:  Insignificant      Brief History: This is a 59year old female who presents for a screening colonoscopy in the setting of a history of intermittent rectal bleeding that is presumed to be hemorrhoidal.  The patient's last colonoscopy was 8-1/2 years prior and negative with the exception of diverticulosis and internal hemorrhoids. The patient also has a longstanding history of intermittent solid food dysphagia. She has heartburn 3 times weekly. There is a family history of esophageal cancer in her father. A concurrent upper endoscopy is being performed as well. Technique:  After informed consent, the patient was placed in the left lateral recumbent position. Digital rectal examination revealed no palpable intraluminal abnormalities. An Olympus variable stiffness 190 series HD pediatric colonoscope was inserted into the rectum and advanced under direct vision by following the lumen to the terminal ileum. The colon was examined upon withdrawal in the left lateral recumbent position. Following colonoscopy, an Olympus adult HD gastroscope was inserted into the hypopharynx and advanced under direct vision into the esophagus, stomach and duodenum.   The endoscope was withdrawn to the stomach where retroflexion of the angulus, body, cardia and fundus was performed. The instrument was straightened, insufflated air and fluid were suctioned and the endoscope was withdrawn. The procedures were well tolerated without immediate complication. Findings:  The preparation of the colon was excellent. There was no blood within the colonic lumen. The terminal ileum was examined for 5 cm and visually normal.  The ileocecal valve was well preserved. The visualized colonic mucosa from the cecum to approximately 20 cm was normal.  From 20 cm to 17 cm the mucosa was granular, erythematous with loss of the normal vascular pattern along with mucosal hemorrhages and superficial erosion. These areas were biopsied. There were #2 3-4 mm sessile polyps in the sigmoid colon that may have been inflammatory. These were cold snare excised and retrieved. No ongoing bleeding. There were no other colonic polyps, mass lesions, vascular anomalies or signs of inflammation seen. Retroflexion in the right colon and rectum revealed prominent internal hemorrhoids with overlying red chito markings in the latter. The esophagus in its proximal and mid portions was normal.  Biopsies were obtained from the distal esophagus a few centimeters above the gastroesophageal junction and from approximately 20 cm and placed in separate containers. There was a ringlike slightly inflamed stricture at the gastroesophageal junction at 35 cm. This did not impede endoscope passage. .  There was a 5 cm hiatal hernia with the diaphragmatic impression at 40 cm. The stomach distended appropriately with insufflated air. The mucosa of the stomach including cardia, fundus, gastric body and antrum was normal with the exception of a few tiny 3 mm pale polyps in the proximal greater curvature which were biopsied.   The duodenal bulb and post bulbar regions were normal.    Following diagnostic endoscopy a TTS balloon dilator was positioned across the gastroesophageal junction. Sequential inflations were made with the 15, 16.5 and 18 mm balloons. The balloon catheter was deflated and withdrawn. There was appropriate fracture of the stricture without signs of deeper injury. Impression:  1. Colon polyps possibly inflammatory  2. Sigmoid colon diverticulosis with segmental colitis associated with diverticulosis (SCAD)  3. Internal hemorrhoids  4. Rectal bleeding likely secondary to #3 although bleeding from #2 cannot be excluded  5. Peptic esophageal stricture  6.  5 cm hiatal hernia  7. Diminutive gastric polyps likely fundic gland in nature  8. Status post esophageal dilatation to 47 [place] TTS      Recommendations:  1. Soft diet. 2.  Follow-up biopsy results. 3.  Assess response to dilatation. 4.  The patient would benefit from a proton pump inhibitor to decrease the risk of recurrent stricture.           Rinku Pina MD  6/22/2023

## 2023-06-22 NOTE — ANESTHESIA POSTPROCEDURE EVALUATION
Patient: Kika Jordan    Procedure Summary     Date: 06/22/23 Room / Location: [address] [place] [address] ENDOSCOPY 05 / [address] [place] [address] ENDOSCOPY    Anesthesia Start: 8227 Anesthesia Stop:     Procedures:       COLONOSCOPY with biopsy and polypectomy      ESOPHAGOGASTRODUODENOSCOPY (EGD) with biopsy and dilation to 18 mm Diagnosis:       Screening for colon cancer      Rectal bleeding      Gastroesophageal reflux disease, unspecified whether esophagitis present      Dysphagia, unspecified type      Family history of esophageal cancer      (Colon polyps, diverticulosis, colitis, internal hemorrhoids, gastric polyps, esophageal stricture, hiatal hernia)    Surgeons: Karime Tyson MD Anesthesiologist: Radha Chavez CRNA    Anesthesia Type: general, MAC ASA Status: 2          Anesthesia Type: general, MAC    Vitals Value Taken Time   /81 06/22/23 0826   Temp  06/22/23 0828   Pulse 83 06/22/23 0827   Resp 17 06/22/23 0827   SpO2 99 % 06/22/23 0827   Vitals shown include unvalidated device data. EM AN Post Evaluation:   Patient Evaluated in Patient location: [place] [address]. Patient Participation: complete - patient participated  Level of Consciousness: awake  Pain Score: 0  Pain Management: adequate  Airway Patency:patent  Dental exam unchanged from preop  Yes    Cardiovascular Status: stable  Respiratory Status: room air  Postoperative Hydration stable      Sandra Mo.  Larene Mortimer, CRNA  6/22/2023 8:28 AM

## 2023-06-23 VITALS
SYSTOLIC BLOOD PRESSURE: 140 MMHG | BODY MASS INDEX: 27.47 KG/M2 | WEIGHT: 175 LBS | RESPIRATION RATE: 17 BRPM | OXYGEN SATURATION: 100 % | DIASTOLIC BLOOD PRESSURE: 86 MMHG | HEART RATE: 60 BPM | TEMPERATURE: 97 F | HEIGHT: 67 IN

## 2023-06-27 RX ORDER — OMEPRAZOLE 40 MG/1
40 CAPSULE, DELAYED RELEASE ORAL DAILY
Qty: 90 CAPSULE | Refills: 3 | Status: SHIPPED | OUTPATIENT
Start: 2023-06-27

## 2023-06-29 ENCOUNTER — TELEPHONE (OUTPATIENT)
Facility: CLINIC | Age: 58
End: 2023-06-29

## 2023-07-11 ENCOUNTER — OFFICE VISIT (OUTPATIENT)
Dept: FAMILY MEDICINE CLINIC | Facility: CLINIC | Age: 58
End: 2023-07-11

## 2023-07-11 VITALS
OXYGEN SATURATION: 98 % | WEIGHT: 175.38 LBS | TEMPERATURE: 98 F | DIASTOLIC BLOOD PRESSURE: 74 MMHG | HEIGHT: 67 IN | HEART RATE: 71 BPM | SYSTOLIC BLOOD PRESSURE: 118 MMHG | BODY MASS INDEX: 27.53 KG/M2

## 2023-07-11 DIAGNOSIS — R30.0 DYSURIA: Primary | ICD-10-CM

## 2023-07-11 LAB
APPEARANCE: CLEAR
GLUCOSE (URINE DIPSTICK): NEGATIVE MG/DL
KETONES (URINE DIPSTICK): 15 MG/DL
MULTISTIX LOT#: ABNORMAL NUMERIC
NITRITE, URINE: NEGATIVE
PH, URINE: 5 (ref 4.5–8)
SPECIFIC GRAVITY: 1.03 (ref 1–1.03)
URINE-COLOR: YELLOW
UROBILINOGEN,SEMI-QN: 1 MG/DL (ref 0–1.9)

## 2023-07-11 PROCEDURE — 99213 OFFICE O/P EST LOW 20 MIN: CPT

## 2023-07-11 PROCEDURE — 81002 URINALYSIS NONAUTO W/O SCOPE: CPT

## 2023-07-11 PROCEDURE — 3078F DIAST BP <80 MM HG: CPT

## 2023-07-11 PROCEDURE — 3008F BODY MASS INDEX DOCD: CPT

## 2023-07-11 PROCEDURE — 3074F SYST BP LT 130 MM HG: CPT

## 2023-07-11 RX ORDER — NITROFURANTOIN MACROCRYSTALS 100 MG/1
100 CAPSULE ORAL 4 TIMES DAILY
Qty: 12 CAPSULE | Refills: 0 | Status: SHIPPED | OUTPATIENT
Start: 2023-07-11 | End: 2023-07-14

## 2023-08-15 ENCOUNTER — OFFICE VISIT (OUTPATIENT)
Dept: FAMILY MEDICINE CLINIC | Facility: CLINIC | Age: 58
End: 2023-08-15

## 2023-08-15 VITALS
HEIGHT: 67 IN | BODY MASS INDEX: 27.62 KG/M2 | DIASTOLIC BLOOD PRESSURE: 89 MMHG | SYSTOLIC BLOOD PRESSURE: 128 MMHG | HEART RATE: 69 BPM | RESPIRATION RATE: 18 BRPM | OXYGEN SATURATION: 97 % | WEIGHT: 176 LBS

## 2023-08-15 DIAGNOSIS — K52.9 GASTROENTERITIS: Primary | ICD-10-CM

## 2023-08-15 PROCEDURE — 3079F DIAST BP 80-89 MM HG: CPT | Performed by: FAMILY MEDICINE

## 2023-08-15 PROCEDURE — 3008F BODY MASS INDEX DOCD: CPT | Performed by: FAMILY MEDICINE

## 2023-08-15 PROCEDURE — 3074F SYST BP LT 130 MM HG: CPT | Performed by: FAMILY MEDICINE

## 2023-08-15 PROCEDURE — 99213 OFFICE O/P EST LOW 20 MIN: CPT | Performed by: FAMILY MEDICINE

## 2023-09-11 ENCOUNTER — OFFICE VISIT (OUTPATIENT)
Dept: FAMILY MEDICINE CLINIC | Facility: CLINIC | Age: 58
End: 2023-09-11

## 2023-09-11 VITALS
HEIGHT: 67 IN | BODY MASS INDEX: 27.62 KG/M2 | TEMPERATURE: 98 F | SYSTOLIC BLOOD PRESSURE: 120 MMHG | DIASTOLIC BLOOD PRESSURE: 90 MMHG | WEIGHT: 176 LBS | HEART RATE: 71 BPM | RESPIRATION RATE: 18 BRPM | OXYGEN SATURATION: 95 %

## 2023-09-11 DIAGNOSIS — J02.9 SORE THROAT: Primary | ICD-10-CM

## 2023-09-11 DIAGNOSIS — R21 RASH: ICD-10-CM

## 2023-09-11 PROCEDURE — 3080F DIAST BP >= 90 MM HG: CPT

## 2023-09-11 PROCEDURE — 3074F SYST BP LT 130 MM HG: CPT

## 2023-09-11 PROCEDURE — 3008F BODY MASS INDEX DOCD: CPT

## 2023-09-11 PROCEDURE — 99213 OFFICE O/P EST LOW 20 MIN: CPT

## 2023-09-11 RX ORDER — TRIAMCINOLONE ACETONIDE 1 MG/G
CREAM TOPICAL
Qty: 80 G | Refills: 0 | Status: SHIPPED | OUTPATIENT
Start: 2023-09-11

## 2023-09-11 NOTE — PATIENT INSTRUCTIONS
All/Tide Free and Clear detergent  Dove sensitive skin bar soap  Aveeno or Eucerin lotion    Benadryl as needed

## 2023-09-12 ENCOUNTER — OFFICE VISIT (OUTPATIENT)
Dept: OTOLARYNGOLOGY | Facility: CLINIC | Age: 58
End: 2023-09-12

## 2023-09-12 VITALS — HEIGHT: 67 IN | WEIGHT: 176 LBS | BODY MASS INDEX: 27.62 KG/M2

## 2023-09-12 DIAGNOSIS — H60.392 OTHER INFECTIVE ACUTE OTITIS EXTERNA OF LEFT EAR: Primary | ICD-10-CM

## 2023-09-12 DIAGNOSIS — H60.391 OTHER INFECTIVE ACUTE OTITIS EXTERNA OF RIGHT EAR: ICD-10-CM

## 2023-09-12 PROCEDURE — 92504 EAR MICROSCOPY EXAMINATION: CPT | Performed by: OTOLARYNGOLOGY

## 2023-09-12 PROCEDURE — 3008F BODY MASS INDEX DOCD: CPT | Performed by: OTOLARYNGOLOGY

## 2023-09-12 PROCEDURE — 99213 OFFICE O/P EST LOW 20 MIN: CPT | Performed by: OTOLARYNGOLOGY

## 2023-09-12 RX ORDER — TOBRAMYCIN AND DEXAMETHASONE 3; 1 MG/ML; MG/ML
3 SUSPENSION/ DROPS OPHTHALMIC EVERY 8 HOURS
Qty: 10 ML | Refills: 0 | Status: SHIPPED | OUTPATIENT
Start: 2023-09-12

## 2023-09-12 RX ORDER — CIPROFLOXACIN 500 MG/1
500 TABLET, FILM COATED ORAL EVERY 12 HOURS
Qty: 14 TABLET | Refills: 0 | Status: SHIPPED | OUTPATIENT
Start: 2023-09-12

## 2023-09-14 ENCOUNTER — PATIENT MESSAGE (OUTPATIENT)
Dept: FAMILY MEDICINE CLINIC | Facility: CLINIC | Age: 58
End: 2023-09-14

## 2023-09-14 ENCOUNTER — NURSE TRIAGE (OUTPATIENT)
Dept: FAMILY MEDICINE CLINIC | Facility: CLINIC | Age: 58
End: 2023-09-14

## 2023-09-15 ENCOUNTER — OFFICE VISIT (OUTPATIENT)
Dept: FAMILY MEDICINE CLINIC | Facility: CLINIC | Age: 58
End: 2023-09-15

## 2023-09-15 VITALS
HEIGHT: 67 IN | SYSTOLIC BLOOD PRESSURE: 132 MMHG | BODY MASS INDEX: 27.6 KG/M2 | WEIGHT: 175.81 LBS | OXYGEN SATURATION: 96 % | TEMPERATURE: 98 F | DIASTOLIC BLOOD PRESSURE: 85 MMHG | HEART RATE: 68 BPM

## 2023-09-15 DIAGNOSIS — B02.9 HERPES ZOSTER WITHOUT COMPLICATION: Primary | ICD-10-CM

## 2023-09-15 PROBLEM — B02.23 SHINGLES (HERPES ZOSTER) POLYNEUROPATHY: Status: ACTIVE | Noted: 2023-09-15

## 2023-09-15 PROCEDURE — 3079F DIAST BP 80-89 MM HG: CPT | Performed by: FAMILY MEDICINE

## 2023-09-15 PROCEDURE — 99214 OFFICE O/P EST MOD 30 MIN: CPT | Performed by: FAMILY MEDICINE

## 2023-09-15 PROCEDURE — 3008F BODY MASS INDEX DOCD: CPT | Performed by: FAMILY MEDICINE

## 2023-09-15 PROCEDURE — 3075F SYST BP GE 130 - 139MM HG: CPT | Performed by: FAMILY MEDICINE

## 2023-09-15 RX ORDER — METHYLPREDNISOLONE 4 MG/1
TABLET ORAL
Qty: 1 EACH | Refills: 0 | Status: SHIPPED | OUTPATIENT
Start: 2023-09-15

## 2023-09-15 RX ORDER — VALACYCLOVIR HYDROCHLORIDE 1 G/1
1000 TABLET, FILM COATED ORAL EVERY 12 HOURS SCHEDULED
Qty: 14 TABLET | Refills: 0 | Status: SHIPPED | OUTPATIENT
Start: 2023-09-15 | End: 2023-09-22

## 2023-09-21 ENCOUNTER — TELEPHONE (OUTPATIENT)
Dept: FAMILY MEDICINE CLINIC | Facility: CLINIC | Age: 58
End: 2023-09-21

## 2023-09-21 RX ORDER — METHYLPREDNISOLONE 4 MG/1
TABLET ORAL
Qty: 1 EACH | Refills: 0 | Status: SHIPPED | OUTPATIENT
Start: 2023-09-21

## 2023-09-21 RX ORDER — VALACYCLOVIR HYDROCHLORIDE 1 G/1
1000 TABLET, FILM COATED ORAL EVERY 12 HOURS SCHEDULED
Qty: 14 TABLET | Refills: 0 | Status: SHIPPED | OUTPATIENT
Start: 2023-09-21 | End: 2023-09-28

## 2023-09-21 NOTE — TELEPHONE ENCOUNTER
Patient called with condition update. Treated with valacyclovir and prednisone for shingles. Finished prednisone and is on her last day of valacyclovir. Still has painful rash present. Improved but still present. Inquires if medications should be continued.

## 2023-10-06 ENCOUNTER — OFFICE VISIT (OUTPATIENT)
Dept: OTOLARYNGOLOGY | Facility: CLINIC | Age: 58
End: 2023-10-06

## 2023-10-06 VITALS — BODY MASS INDEX: 27.47 KG/M2 | HEIGHT: 67 IN | WEIGHT: 175 LBS

## 2023-10-06 DIAGNOSIS — H60.391 OTHER INFECTIVE ACUTE OTITIS EXTERNA OF RIGHT EAR: Primary | ICD-10-CM

## 2023-10-06 PROCEDURE — 99213 OFFICE O/P EST LOW 20 MIN: CPT | Performed by: OTOLARYNGOLOGY

## 2023-10-06 PROCEDURE — 3008F BODY MASS INDEX DOCD: CPT | Performed by: OTOLARYNGOLOGY

## 2023-10-06 PROCEDURE — 92504 EAR MICROSCOPY EXAMINATION: CPT | Performed by: OTOLARYNGOLOGY

## 2023-10-13 ENCOUNTER — OFFICE VISIT (OUTPATIENT)
Dept: FAMILY MEDICINE CLINIC | Facility: CLINIC | Age: 58
End: 2023-10-13

## 2023-10-13 VITALS
OXYGEN SATURATION: 96 % | HEART RATE: 75 BPM | WEIGHT: 176 LBS | TEMPERATURE: 97 F | DIASTOLIC BLOOD PRESSURE: 73 MMHG | SYSTOLIC BLOOD PRESSURE: 111 MMHG | HEIGHT: 67 IN | BODY MASS INDEX: 27.62 KG/M2

## 2023-10-13 DIAGNOSIS — B02.29 POST HERPETIC NEURALGIA: Primary | ICD-10-CM

## 2023-10-13 PROCEDURE — 3078F DIAST BP <80 MM HG: CPT | Performed by: FAMILY MEDICINE

## 2023-10-13 PROCEDURE — 3074F SYST BP LT 130 MM HG: CPT | Performed by: FAMILY MEDICINE

## 2023-10-13 PROCEDURE — 3008F BODY MASS INDEX DOCD: CPT | Performed by: FAMILY MEDICINE

## 2023-10-13 PROCEDURE — 99213 OFFICE O/P EST LOW 20 MIN: CPT | Performed by: FAMILY MEDICINE

## 2023-10-13 RX ORDER — GABAPENTIN 300 MG/1
300 CAPSULE ORAL 2 TIMES DAILY
Qty: 60 CAPSULE | Refills: 2 | Status: SHIPPED | OUTPATIENT
Start: 2023-10-13

## 2023-10-25 ENCOUNTER — OFFICE VISIT (OUTPATIENT)
Dept: DERMATOLOGY CLINIC | Facility: CLINIC | Age: 58
End: 2023-10-25
Payer: COMMERCIAL

## 2023-10-25 DIAGNOSIS — D22.9 MULTIPLE NEVI: ICD-10-CM

## 2023-10-25 DIAGNOSIS — L82.1 SK (SEBORRHEIC KERATOSIS): Primary | ICD-10-CM

## 2023-10-25 DIAGNOSIS — B02.23 SHINGLES (HERPES ZOSTER) POLYNEUROPATHY: ICD-10-CM

## 2023-10-25 DIAGNOSIS — D23.9 BENIGN NEOPLASM OF SKIN, UNSPECIFIED LOCATION: ICD-10-CM

## 2023-10-25 DIAGNOSIS — L30.9 DERMATITIS: ICD-10-CM

## 2023-10-25 DIAGNOSIS — L81.4 LENTIGO: ICD-10-CM

## 2023-10-25 PROCEDURE — 99203 OFFICE O/P NEW LOW 30 MIN: CPT | Performed by: DERMATOLOGY

## 2023-10-25 RX ORDER — CLOBETASOL PROPIONATE 0.5 MG/G
OINTMENT TOPICAL
Qty: 60 G | Refills: 1 | Status: SHIPPED | OUTPATIENT
Start: 2023-10-25

## 2023-10-30 ENCOUNTER — OFFICE VISIT (OUTPATIENT)
Dept: OBGYN CLINIC | Facility: CLINIC | Age: 58
End: 2023-10-30

## 2023-10-30 VITALS
BODY MASS INDEX: 28.56 KG/M2 | HEIGHT: 67 IN | SYSTOLIC BLOOD PRESSURE: 126 MMHG | HEART RATE: 66 BPM | DIASTOLIC BLOOD PRESSURE: 84 MMHG | WEIGHT: 182 LBS

## 2023-10-30 DIAGNOSIS — Z01.419 WELL WOMAN EXAM WITH ROUTINE GYNECOLOGICAL EXAM: Primary | ICD-10-CM

## 2023-10-30 DIAGNOSIS — Z12.31 ENCOUNTER FOR SCREENING MAMMOGRAM FOR MALIGNANT NEOPLASM OF BREAST: ICD-10-CM

## 2023-10-30 PROCEDURE — 99396 PREV VISIT EST AGE 40-64: CPT | Performed by: NURSE PRACTITIONER

## 2023-10-30 PROCEDURE — 3079F DIAST BP 80-89 MM HG: CPT | Performed by: NURSE PRACTITIONER

## 2023-10-30 PROCEDURE — 3008F BODY MASS INDEX DOCD: CPT | Performed by: NURSE PRACTITIONER

## 2023-10-30 PROCEDURE — 3074F SYST BP LT 130 MM HG: CPT | Performed by: NURSE PRACTITIONER

## 2023-11-05 NOTE — PROGRESS NOTES
Alexia Enriquez is a 59year old female. HPI:     CC:  Patient presents with:  Full Skin Exam: LOV . Pt denies any hx of skin ca. Pt present for full body exam. Pt present with shingles, currently taking gabapentin 300. Pt concern with a dry patch between her thumb and index finger, dry patch can be painful when skin starts to crack x 1 year. Pt was given triamcinolone 0.1 % for 2 weeks, which seemed to help. Allergies:  Amoxicillin-Pot Clavulanate, Augmentin  [Amoclan], Sulfa Antibiotics, Sulfamethoxazole, Trimethoprim, and Trimethoprim    HISTORY:    Past Medical History:   Diagnosis Date    Calculus of kidney     Diverticulitis 2009    medication    Esophageal reflux 2023    Taking omeprazole    Essential hypertension     High blood pressure     Migraines     PONV (postoperative nausea and vomiting)     Recurrent acute otitis media Major ear problems since childhood      Past Surgical History:   Procedure Laterality Date    BREAST BIOPSY      breast lump           x2    COLONOSCOPY      COLONOSCOPY N/A 2023    Procedure: COLONOSCOPY with biopsy and polypectomy;  Surgeon: Miesha Manzo MD;  Location: [address] [place] [address] ENDOSCOPY    CONTRACEPT IUD  2009    CYST ASPIRATION LEFT  2010    D & C      x2    NEEDLE BIOPSY LEFT      OTHER SURGICAL HISTORY      ear surgery    TONSILLECTOMY        Family History   Problem Relation Age of Onset    Cancer Father         Esophageal    Hypertension Mother     Cancer Maternal Grandfather         Colon cancer      Social History     Socioeconomic History    Marital status:    Tobacco Use    Smoking status: Former     Packs/day: 0.50     Years: 4.00     Additional pack years: 0.00     Total pack years: 2.00     Types: Cigarettes     Quit date: 1983     Years since quittin.8    Smokeless tobacco: Never    Tobacco comments:     quit    Vaping Use    Vaping Use: Never used   Substance and Sexual Activity    Alcohol use:  Yes Alcohol/week: 2.0 standard drinks of alcohol     Types: 2 Standard drinks or equivalent per week     Comment: 2 drinks weekly    Drug use: No    Sexual activity: Yes   Other Topics Concern    Caffeine Concern Yes     Comment: 2 cups of coffee     Grew up on a farm No    History of tanning No    Outdoor occupation No    Breast feeding No    Reaction to local anesthetic No    Pt has a pacemaker No    Pt has a defibrillator No        Current Outpatient Medications   Medication Sig Dispense Refill    clobetasol 0.05 % External Ointment Use bid 60 g 1    gabapentin 300 MG Oral Cap Take 1 capsule (300 mg total) by mouth 2 (two) times daily. 60 capsule 2    tobramycin-dexamethasone 0.3-0.1 % Ophthalmic Suspension Place 3 drops in ear(s) every 8 (eight) hours. 10 mL 0    triamcinolone 0.1 % External Cream Apply to affected areas twice daily x 2 weeks. Stop for one week. Repeat as needed. 80 g 0    Omeprazole 40 MG Oral Capsule Delayed Release Take 1 capsule (40 mg total) by mouth daily. Take 1 capsule by mouth daily before breakfast. 90 capsule 3    zolpidem (AMBIEN) 5 MG Oral Tab Take 1 tablet (5 mg total) by mouth nightly as needed for Sleep. 30 tablet 5    metoprolol succinate ER 50 MG Oral Tablet 24 Hr Take 1 tablet (50 mg total) by mouth daily. 90 tablet 3    triamcinolone 0.1 % External Cream Apply to affected area twice daily x two weeks.  Stop for one week, repeat as needed 60 g 1     Allergies:     Amoxicillin-Pot Cla*    OTHER (SEE COMMENTS)  Augmentin  [Amoclan]    NAUSEA AND VOMITING  Sulfa Antibiotics       NAUSEA AND VOMITING  Sulfamethoxazole        NAUSEA AND VOMITING  Trimethoprim            UNKNOWN  Trimethoprim            UNKNOWN    Past Medical History:   Diagnosis Date    Calculus of kidney     Diverticulitis 04/2009    medication    Esophageal reflux 6/2023    Taking omeprazole    Essential hypertension     High blood pressure     Migraines     PONV (postoperative nausea and vomiting)     Recurrent acute otitis media Major ear problems since childhood     Past Surgical History:   Procedure Laterality Date    BREAST BIOPSY  2010    breast lump           x2    COLONOSCOPY      COLONOSCOPY N/A 2023    Procedure: COLONOSCOPY with biopsy and polypectomy;  Surgeon: Jony William MD;  Location: [address] [place] [address] ENDOSCOPY    CONTRACEPT IUD  2009    CYST ASPIRATION LEFT  2010    D & C      x2    NEEDLE BIOPSY LEFT      OTHER SURGICAL HISTORY      ear surgery    TONSILLECTOMY       Social History    Socioeconomic History      Marital status:       Spouse name: Not on file      Number of children: Not on file      Years of education: Not on file      Highest education level: Not on file    Occupational History      Not on file    Tobacco Use      Smoking status: Former        Packs/day: 0.50        Years: 4.00        Additional pack years: 0.00        Total pack years: 2.00        Types: Cigarettes        Quit date: 1983        Years since quittin.8      Smokeless tobacco: Never      Tobacco comments: quit     Vaping Use      Vaping Use: Never used    Substance and Sexual Activity      Alcohol use:  Yes        Alcohol/week: 2.0 standard drinks of alcohol        Types: 2 Standard drinks or equivalent per week        Comment: 2 drinks weekly      Drug use: No      Sexual activity: Yes    Other Topics      Concerns:         Service: Not Asked        Blood Transfusions: Not Asked        Caffeine Concern: Yes          2 cups of coffee         Occupational Exposure: Not Asked        Hobby Hazards: Not Asked        Sleep Concern: Not Asked        Stress Concern: Not Asked        Weight Concern: Not Asked        Special Diet: Not Asked        Back Care: Not Asked        Exercise: Not Asked        Bike Helmet: Not Asked        Seat Belt: Not Asked        Self-Exams: Not Asked        Grew up on a farm: No        History of tanning: No        Outdoor occupation: No        Breast feeding: No        Reaction to local anesthetic: No        Pt has a pacemaker: No        Pt has a defibrillator: No    Social History Narrative      Not on file    Social Determinants of Health  Financial Resource Strain: Not on file  Food Insecurity: Not on file  Transportation Needs: Not on file  Physical Activity: Not on file  Stress: Not on file  Social Connections: Not on file  Housing Stability: Not on file  Family History   Problem Relation Age of Onset    Cancer Father         Esophageal    Hypertension Mother     Cancer Maternal Grandfather         Colon cancer       There were no vitals filed for this visit. HPI:  Patient presents with:  Full Skin Exam: LOV 06/20. Pt denies any hx of skin ca. Pt present for full body exam. Pt present with shingles, currently taking gabapentin 300. Pt concern with a dry patch between her thumb and index finger, dry patch can be painful when skin starts to crack x 1 year. Pt was given triamcinolone 0.1 % for 2 weeks, which seemed to help. Follow-up patient with history of dermatitis recent shingles on gabapentin has noted scaly area on hand  Triamcinolone helps slightly    No personal  or family history of skin cancer    Patient presents with concerns above. Patient has been in their usual state of health. Past notes/ records and appropriate/relevant lab results including pathology and past body maps reviewed. Including outside notes/ PCP notes as appropriate. Updated and new information noted in current visit. ROS:  Denies other relevant systemic complaints. History, medications, allergies reviewed as noted. Physical Examination:     Well-developed well-nourished patient alert oriented in no acute distress. Exam performed, including scalp, head, neck, face,nails, hair, external eyes, including conjunctival mucosa, eyelids, lips external ears , arms, digits,palms.      Multiple light to medium brown, well marginated, uniformly pigmented, macules and papules 6 mm and less scattered on exam. pigmented lesions examined with dermoscopy benign-appearing patterns. Waxy tannish keratotic papules scattered, cherry-red vascular papules scattered. See map today's date for lesions noted . See assessment and plan below for specific lesions. Otherwise remarkable for lesions as noted on map. See A/P  below for additional information:    Assessment / plan:    No orders of the defined types were placed in this encounter. Meds & Refills for this Visit:  Requested Prescriptions     Signed Prescriptions Disp Refills    clobetasol 0.05 % External Ointment 60 g 1     Sig: Use bid         Sk (seborrheic keratosis)  (primary encounter diagnosis)  Lentigo  Multiple nevi  Benign neoplasm of skin, unspecified location  Dermatitis  Shingles (herpes zoster) polyneuropathy      Eczematous patch at right hand base of thumb  Clobetasol    Dermatitis. Meds in grid. Skin care instructions reviewed. [place] use of emollients. Pathophysiology reviewed. Consider Contac allergy in differential.  Consider patch testing. Patient will let us know how they are doing over the next several weeks. Await clinical response to above therapies. Over-the-counter eczema creams with simethicone  Good hand care    Benign nevi lentigines keratoses. Dermatofibroma's lower extremity left reassurance. Zoster resolving still slight erythema may use clobetasol patient will let us know how they are doing over the next several weeks. Await clinical response to above therapy. If itchy continue gabapentin  Will let us know if worsening symptoms. For scarring may use Mederma or silicone gel once area has healed    Reassurance regarding benign keratoses waxy tan keratotic papules lesions in areas of concern as noted reassurance given. Benign nature discussed. Possibility of cryo, alphahydroxy acids over-the-counter retinol's discussed.   No other susupicious lesions on todays exam.    Please refer to map for specific lesions. See additional diagnoses. Pros cons of various therapies, risks benefits discussed. Pathophysiology discussed with patient. Therapeutic options reviewed. See  Medications in grid. Instructions reviewed at length. Benign nevi, seborrheic  keratoses, cherry angiomas:  Reassurance regarding other benign skin lesions. Signs and symptoms of skin cancer, ABCDE's of melanoma discussed with patient. Sunscreen use, sun protection, self exams reviewed. Followup as noted RTC ---routine checkup    6 mos -one year or p.r.n. Encounter Times   Including precharting, reviewing chart, prior notes obtaining history: 10 minutes, medical exam :10 minutes, notes on body map, plan, counseling 10minutes My total time spent caring for the patient on the day of the encounter: 30 minutes     The patient indicates understanding of these issues and agrees to the plan. The patient is asked to return as noted in follow-up/ above. This note was generated using Dragon voice recognition software. Please contact me regarding any confusion resulting from errors in recognition. .  Note to patient and family: The [facility] makes medical notes like these available to patients. However, be advised this is a medical document. It is intended as [id number] communication and monitoring of a patient's care needs. It is written in medical language and may contain abbreviations or verbiage that are unfamiliar. It may appear blunt or direct. Medical documents are intended to carry relevant information, facts as evident and the clinical opinion of the practitioner.

## 2023-11-28 ENCOUNTER — LAB ENCOUNTER (OUTPATIENT)
Dept: LAB | Facility: HOSPITAL | Age: 58
End: 2023-11-28
Attending: FAMILY MEDICINE
Payer: COMMERCIAL

## 2023-11-28 DIAGNOSIS — Z00.00 ROUTINE GENERAL MEDICAL EXAMINATION AT A HEALTH CARE FACILITY: ICD-10-CM

## 2023-11-28 LAB
ALBUMIN SERPL-MCNC: 4.1 G/DL (ref 3.2–4.8)
ALBUMIN/GLOB SERPL: 1.7 {RATIO} (ref 1–2)
ALP LIVER SERPL-CCNC: 83 U/L
ALT SERPL-CCNC: 20 U/L
ANION GAP SERPL CALC-SCNC: 8 MMOL/L (ref 0–18)
AST SERPL-CCNC: 18 U/L (ref ?–34)
BASOPHILS # BLD AUTO: 0.06 X10(3) UL (ref 0–0.2)
BASOPHILS NFR BLD AUTO: 1.4 %
BILIRUB SERPL-MCNC: 0.8 MG/DL (ref 0.2–1.1)
BUN BLD-MCNC: 15 MG/DL (ref 9–23)
BUN/CREAT SERPL: 19.7 (ref 10–20)
CALCIUM BLD-MCNC: 9.7 MG/DL (ref 8.7–10.4)
CHLORIDE SERPL-SCNC: 106 MMOL/L (ref 98–112)
CHOLEST SERPL-MCNC: 204 MG/DL (ref ?–200)
CO2 SERPL-SCNC: 27 MMOL/L (ref 21–32)
CREAT BLD-MCNC: 0.76 MG/DL
DEPRECATED RDW RBC AUTO: 42.3 FL (ref 35.1–46.3)
EGFRCR SERPLBLD CKD-EPI 2021: 90 ML/MIN/1.73M2 (ref 60–?)
EOSINOPHIL # BLD AUTO: 0.2 X10(3) UL (ref 0–0.7)
EOSINOPHIL NFR BLD AUTO: 4.6 %
ERYTHROCYTE [DISTWIDTH] IN BLOOD BY AUTOMATED COUNT: 13.9 % (ref 11–15)
FASTING PATIENT LIPID ANSWER: YES
FASTING STATUS PATIENT QL REPORTED: YES
GLOBULIN PLAS-MCNC: 2.4 G/DL (ref 2.8–4.4)
GLUCOSE BLD-MCNC: 93 MG/DL (ref 70–99)
HCT VFR BLD AUTO: 40.6 %
HDLC SERPL-MCNC: 56 MG/DL (ref 40–59)
HGB BLD-MCNC: 13.5 G/DL
IMM GRANULOCYTES # BLD AUTO: 0.02 X10(3) UL (ref 0–1)
IMM GRANULOCYTES NFR BLD: 0.5 %
LDLC SERPL CALC-MCNC: 132 MG/DL (ref ?–100)
LYMPHOCYTES # BLD AUTO: 1.45 X10(3) UL (ref 1–4)
LYMPHOCYTES NFR BLD AUTO: 33.2 %
MCH RBC QN AUTO: 27.5 PG (ref 26–34)
MCHC RBC AUTO-ENTMCNC: 33.3 G/DL (ref 31–37)
MCV RBC AUTO: 82.7 FL
MONOCYTES # BLD AUTO: 0.44 X10(3) UL (ref 0.1–1)
MONOCYTES NFR BLD AUTO: 10.1 %
NEUTROPHILS # BLD AUTO: 2.2 X10 (3) UL (ref 1.5–7.7)
NEUTROPHILS # BLD AUTO: 2.2 X10(3) UL (ref 1.5–7.7)
NEUTROPHILS NFR BLD AUTO: 50.2 %
NONHDLC SERPL-MCNC: 148 MG/DL (ref ?–130)
OSMOLALITY SERPL CALC.SUM OF ELEC: 293 MOSM/KG (ref 275–295)
PLATELET # BLD AUTO: 268 10(3)UL (ref 150–450)
POTASSIUM SERPL-SCNC: 4.3 MMOL/L (ref 3.5–5.1)
PROT SERPL-MCNC: 6.5 G/DL (ref 5.7–8.2)
RBC # BLD AUTO: 4.91 X10(6)UL
SODIUM SERPL-SCNC: 141 MMOL/L (ref 136–145)
TRIGL SERPL-MCNC: 91 MG/DL (ref 30–149)
VLDLC SERPL CALC-MCNC: 16 MG/DL (ref 0–30)
WBC # BLD AUTO: 4.4 X10(3) UL (ref 4–11)

## 2023-11-28 PROCEDURE — 80061 LIPID PANEL: CPT

## 2023-11-28 PROCEDURE — 36415 COLL VENOUS BLD VENIPUNCTURE: CPT

## 2023-11-28 PROCEDURE — 85025 COMPLETE CBC W/AUTO DIFF WBC: CPT

## 2023-11-28 PROCEDURE — 80053 COMPREHEN METABOLIC PANEL: CPT

## 2023-12-01 ENCOUNTER — OFFICE VISIT (OUTPATIENT)
Dept: OTOLARYNGOLOGY | Facility: CLINIC | Age: 58
End: 2023-12-01
Payer: COMMERCIAL

## 2023-12-01 DIAGNOSIS — H72.91 PERFORATION OF RIGHT TYMPANIC MEMBRANE: Primary | ICD-10-CM

## 2023-12-01 PROCEDURE — 99213 OFFICE O/P EST LOW 20 MIN: CPT | Performed by: OTOLARYNGOLOGY

## 2023-12-01 NOTE — PROGRESS NOTES
Alexia Enriquez is a 59year old female. Chief Complaint   Patient presents with    Ear Problem     Patient reports drainage and clogged right ear        HISTORY OF PRESENT ILLNESS    I have previously seen her for routine ear cleanings. She presents today with complaints regarding a fullness and discomfort in her right ear canal.  She may have gotten some water in her ear and does have a history of a perforation on that side. No other signs, symptoms or complaints. 10/26/17 I have seen her in the past for acute episodes of otitis externa on the right. Known perforation on that side. She believes she may have gotten some water in her right ear recently. Now with pain discomfort drainage and muffled hearing. No other signs, symptoms or complaints. 1/18/18 she presents today with drainage and discomfort on the left ear with decreased hearing. She typically will have an occasional infective otitis externa on the right side due to a perforation. Known tube in the left tympanic membrane. Decreased hearing drainage and some discomfort. No other new signs, symptoms or complaints. 7/30/19 did well up until a few days ago when she started having some drainage from her right ear. She is been following strict water precautions. Known perforation on the right side. 11/5/19 she presents with a history of tubes placed many years ago. She states that the tube in her left ear has been in place for about 30 years. Known perforation of the right tympanic membrane. Last visit she had an infection on the right. Today she presents with a 5 to 6-day history of drainage without discomfort from the left ear. She does use water precautions in the form of earplugs but states that even then [place] water gets into her ear on occasion. She has some of Floxin drops at home. Has not started them. No other signs, symptoms or complaints. No problems with the right ear.      11/21/19 last visit she was started on ofloxacin drops and states that she feels there is still plugged and still not hearing as well. Some discomfort as well on that same side. Previously noted to have an old T-tube in place that she is had for about 35 years. Had some drainage which was cleaned. No new signs, symptoms or complaints stop drops about a week ago generally help while she was on them either. 12/5/19 last visit she was started on ciprofloxacin tablets and changed to TobraDex eardrops from ofloxacin and she notes complete resolution of her symptoms on the left side. Once have her right ear checked. Known perforation on the right old tympanostomy tube on the left. No further drainage pain discomfort. Feels that her hearing is reasonably good in both ears. 1/9/20 she did well after being on ciprofloxacin and TobraDex eardrops with complete resolution of her drainage from the left ear. She has a tube that is been in place for almost 30 years. Placed by another physician. I did discuss with her possibly having to remove this tube at some point. She now presents with several day history of draining and discomfort in that left ear with decreased hearing once again. She follow strict water precautions. 2/6/20 doing well after removal of old tube that is been present for 30 years. No further drainage or discomfort in the left ear. Here for routine follow-up to see if her eardrum is closing. Known perforation of the right tympanic membrane. No other signs, symptoms or complaints at this time     5/7/2020 I have seen in the past for chronic problems on the left. We did remove an old tube that was present for many years. Finally doing well on the left side with no drainage or other problems. Known history of stable perforation on the right side following very strict water precautions. Unfortunately developed drainage from this ear with slight discomfort in the past few days. No real change in hearing.   No other signs, symptoms or complaints. She has used TobraDex eardrops in the past on the left with good resolution of her symptoms. No complaints or concerns on the left at this time. 6/10/2020 having more issues with decreased hearing on the right. When she pops her ear it seems to improve her hearing. Long history of perforation on the right side. Previous tube which has been in place for many years removed on the left. No other signs, symptoms or complaints. 11/5/2020 Patient presents for cerumen removal. No other complaints or concerns at this time     12/22/2020 she did well up until about a week ago when she started noticing some drainage from the right ear. She feels that the left ear is okay. No other signs, symptoms or complaints. 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  No other signs, symptoms or complaints following strict water precautions. No recent cold or allergy. 6/22/12 notes some drainage from the right ear and feels both ears are full and hearing is down. Routinely needs to have her ears cleaned. 9/9/21 having some pain in her cheek with swelling recently. Seen by her dentist previously and told nothing was wrong with her teeth after films pain came back worse even saw dentist over the weekend without any findings in the last few days. Still having swelling and to the cheek and tenderness to palpation of her tooth. Known history of tympanic membrane perforation on the right with recurrent ear infections. Started some amoxicillin recently for her cheek swelling and discomfort. No other signs, symptoms or complaints. She does have a bridge in that area. 9/16./21 she has been on clindamycin for the last week and notes some improvement in her pain and discomfort. Decreased swelling over that right maxillary area.   CT scan does demonstrate an abnormality:   FACIAL BONES: Small periapical dental abscess involving tooth # 3 (series 2, image 42) with minimal adjacent buccal cortical surface breakthrough. No adjacent well-defined/drainable fluid collection. Appears to have a periapical abscess with some adjacent buccal cortical surface breakthrough consistent with her pain and discomfort over that area. I did review the CT scan images with her in the office today. She has been using drops for her ears as well. 2/1/22 continues to have some drainage from her ear using some Ciprodex drops recently. Still feels somewhat full. She has not had any problems throughout several months. 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 Follow strict water precautions. No other signs, symptoms or complaints     6/30/22 Patient presents for cerumen removal. No other complaints or concerns at this time. No drainage from either ear recently. 1/26/23 here for routine ear cleaning. Had a sinus infection earlier in the year required Z-Ashok resolved and within 2 to 3 weeks had a recurrence now doing better without new antibiotic but feels congested. Typically gets her ears cleaned every few months has not been here since June 2022.     4/28/23 Patient presents for cerumen removal. No other complaints or concerns at this time     9/12/23 drainage from the right ear recently. History of perforation on the right side. No pain or discomfort but hearing is muffled. 10/6/23 last visit she was started on she says that she she has no further drainage but feels like her ear is somewhat full. No other signs, symptoms or complaints. 12/1/23 but some drainage in her ear few days ago now presents for evaluation. Stopped draining on the right side. Known perforation on that side follows very strict water precautions no other complaints or concerns.     Social History     Socioeconomic History    Marital status:    Tobacco Use    Smoking status: Former     Packs/day: 0.50     Years: 4.00     Additional pack years: 0.00     Total pack years: 2.00     Types: Cigarettes     Quit date: 1983     Years since quittin.9    Smokeless tobacco: Never    Tobacco comments:     quit    Vaping Use    Vaping Use: Never used   Substance and Sexual Activity    Alcohol use: Yes     Alcohol/week: 2.0 standard drinks of alcohol     Types: 2 Standard drinks or equivalent per week     Comment: 2 drinks weekly    Drug use: No    Sexual activity: Yes   Other Topics Concern    Caffeine Concern Yes     Comment: 2 cups of coffee     Grew up on a farm No    History of tanning No    Outdoor occupation No    Breast feeding No    Reaction to local anesthetic No    Pt has a pacemaker No    Pt has a defibrillator No       Family History   Problem Relation Age of Onset    Cancer Father         Esophageal    Hypertension Mother     Cancer Maternal Grandfather         Colon cancer       Past Medical History:   Diagnosis Date    Calculus of kidney     Diverticulitis 2009    medication    Esophageal reflux 2023    Taking omeprazole    Essential hypertension     High blood pressure     Migraines     PONV (postoperative nausea and vomiting)     Recurrent acute otitis media Major ear problems since childhood       Past Surgical History:   Procedure Laterality Date    BREAST BIOPSY      breast lump           x2    COLONOSCOPY      COLONOSCOPY N/A 2023    Procedure: COLONOSCOPY with biopsy and polypectomy;  Surgeon: Kelsy Mccarthy MD;  Location: [address] [place] [address] ENDOSCOPY    CONTRACEPT IUD  2009    CYST ASPIRATION LEFT  2010    D & C      x2    NEEDLE BIOPSY LEFT      OTHER SURGICAL HISTORY      ear surgery    TONSILLECTOMY           REVIEW OF SYSTEMS    System Neg/Pos Details   Constitutional Negative Fatigue, fever and weight loss. ENMT Negative Drooling. Eyes Negative Blurred vision and vision changes. Respiratory Negative Dyspnea and wheezing.    Cardio Negative Chest pain, irregular heartbeat/palpitations and syncope. GI Negative Abdominal pain and diarrhea. Endocrine Negative Cold intolerance and heat intolerance. Neuro Negative Tremors. Psych Negative Anxiety and depression. Integumentary Negative Frequent skin infections, pigment change and rash. Hema/Lymph Negative Easy bleeding and easy bruising. PHYSICAL EXAM    There were no vitals taken for this visit. Constitutional Normal Overall appearance - Normal.   Psychiatric Normal Orientation - Oriented to time, place, person & situation. Appropriate mood and affect. Neck Exam Normal Inspection - Normal. Palpation - Normal. Parotid gland - Normal. Thyroid gland - Normal.   Eyes Normal Conjunctiva - Right: Normal, Left: Normal. Pupil - Right: Normal, Left: Normal. Fundus - Right: Normal, Left: Normal.   Neurological Normal Memory - Normal. Cranial nerves - Cranial nerves II through XII grossly intact. Head/Face Normal Facial features - Normal. Eyebrows - Normal. Skull - Normal.        Nasopharynx Normal External nose - Normal. Lips/teeth/gums - Normal. Tonsils - Normal. Oropharynx - Normal.   Ears Normal Inspection - Right: Normal, Left: Normal. Canal - Right: Normal, Left: Normal. TM - Right: Dry perforation left: Normal.   Skin Normal Inspection - Normal.        Lymph Detail Normal Submental. Submandibular. Anterior cervical. Posterior cervical. Supraclavicular. Nose/Mouth/Throat Normal External nose - Normal. Lips/teeth/gums - Normal. Tonsils - Normal. Oropharynx - Normal.   Nose/Mouth/Throat Normal Nares - Right: Normal Left: Normal. Septum -Normal  Turbinates - Right: Normal, Left: Normal.   Microscopy  Binocular microscopy was performed. The affected ear(s) was/were examined and all debris removed using suction. The findings are described in the physical exam.   Ears cleaned of debris bilaterally.   Small tiny dry perforation on the right    Current Outpatient Medications:     clobetasol 0.05 % External Ointment, Use bid, Disp: 60 g, Rfl: 1    gabapentin 300 MG Oral Cap, Take 1 capsule (300 mg total) by mouth 2 (two) times daily. , Disp: 60 capsule, Rfl: 2    tobramycin-dexamethasone 0.3-0.1 % Ophthalmic Suspension, Place 3 drops in ear(s) every 8 (eight) hours. , Disp: 10 mL, Rfl: 0    triamcinolone 0.1 % External Cream, Apply to affected areas twice daily x 2 weeks. Stop for one week. Repeat as needed. , Disp: 80 g, Rfl: 0    Omeprazole 40 MG Oral Capsule Delayed Release, Take 1 capsule (40 mg total) by mouth daily. Take 1 capsule by mouth daily before breakfast., Disp: 90 capsule, Rfl: 3    zolpidem (AMBIEN) 5 MG Oral Tab, Take 1 tablet (5 mg total) by mouth nightly as needed for Sleep., Disp: 30 tablet, Rfl: 5    metoprolol succinate ER 50 MG Oral Tablet 24 Hr, Take 1 tablet (50 mg total) by mouth daily. , Disp: 90 tablet, Rfl: 3    triamcinolone 0.1 % External Cream, Apply to affected area twice daily x two weeks. Stop for one week, repeat as needed, Disp: 60 g, Rfl: 1  ASSESSMENT AND PLAN    1. Perforation of right tympanic membrane  Dry perforation at this time I did clean both ears. She will return to see me as needed. Strict water precautions at this time. This note was prepared using [employer] [facility] voice recognition dictation software. As a result errors may occur. When identified these errors have been corrected. While every attempt is made to correct errors during dictation discrepancies may still exist    Nixon Martinez MD    12/1/2023    9:54 AM

## 2024-02-07 ENCOUNTER — HOSPITAL ENCOUNTER (OUTPATIENT)
Dept: MAMMOGRAPHY | Age: 59
Discharge: HOME | End: 2024-02-07
Attending: NURSE PRACTITIONER
Payer: COMMERCIAL

## 2024-02-07 DIAGNOSIS — Z12.31 ENCOUNTER FOR SCREENING MAMMOGRAM FOR MALIGNANT NEOPLASM OF BREAST: ICD-10-CM

## 2024-02-07 PROCEDURE — 77067 SCR MAMMO BI INCL CAD: CPT | Performed by: NURSE PRACTITIONER

## 2024-02-07 PROCEDURE — 77063 BREAST TOMOSYNTHESIS BI: CPT | Performed by: NURSE PRACTITIONER

## 2024-03-13 ENCOUNTER — V-VISIT (OUTPATIENT)
Dept: URGENT CARE | Age: 59
End: 2024-03-13

## 2024-03-13 VITALS
OXYGEN SATURATION: 98 % | DIASTOLIC BLOOD PRESSURE: 96 MMHG | WEIGHT: 178.02 LBS | HEIGHT: 67 IN | BODY MASS INDEX: 27.94 KG/M2 | RESPIRATION RATE: 16 BRPM | HEART RATE: 72 BPM | SYSTOLIC BLOOD PRESSURE: 142 MMHG | TEMPERATURE: 98.2 F

## 2024-03-13 DIAGNOSIS — H61.22 EXCESSIVE CERUMEN IN LEFT EAR CANAL: ICD-10-CM

## 2024-03-13 DIAGNOSIS — J01.40 ACUTE NON-RECURRENT PANSINUSITIS: Primary | ICD-10-CM

## 2024-03-13 DIAGNOSIS — R05.1 ACUTE COUGH: ICD-10-CM

## 2024-03-13 DIAGNOSIS — R03.0 ELEVATED BLOOD PRESSURE READING: ICD-10-CM

## 2024-03-13 PROCEDURE — 99213 OFFICE O/P EST LOW 20 MIN: CPT | Performed by: NURSE PRACTITIONER

## 2024-03-13 RX ORDER — BENZONATATE 100 MG/1
200 CAPSULE ORAL 3 TIMES DAILY PRN
Qty: 30 CAPSULE | Refills: 0 | Status: SHIPPED | OUTPATIENT
Start: 2024-03-13

## 2024-03-13 RX ORDER — DOXYCYCLINE HYCLATE 100 MG
100 TABLET ORAL 2 TIMES DAILY
Qty: 14 TABLET | Refills: 0 | Status: SHIPPED | OUTPATIENT
Start: 2024-03-13 | End: 2024-03-20

## 2024-03-13 RX ORDER — OMEPRAZOLE 40 MG/1
CAPSULE, DELAYED RELEASE ORAL
COMMUNITY
Start: 2024-02-26

## 2024-03-13 ASSESSMENT — ENCOUNTER SYMPTOMS
SINUS PRESSURE: 1
COUGH: 1
PSYCHIATRIC NEGATIVE: 1
WHEEZING: 0
HEADACHES: 1

## 2024-05-23 ENCOUNTER — OFFICE VISIT (OUTPATIENT)
Dept: OTOLARYNGOLOGY | Facility: CLINIC | Age: 59
End: 2024-05-23

## 2024-05-23 DIAGNOSIS — H60.391 OTHER INFECTIVE ACUTE OTITIS EXTERNA OF RIGHT EAR: Primary | ICD-10-CM

## 2024-05-23 PROCEDURE — 92504 EAR MICROSCOPY EXAMINATION: CPT | Performed by: OTOLARYNGOLOGY

## 2024-05-23 PROCEDURE — 99213 OFFICE O/P EST LOW 20 MIN: CPT | Performed by: OTOLARYNGOLOGY

## 2024-05-23 RX ORDER — TOBRAMYCIN AND DEXAMETHASONE 3; 1 MG/ML; MG/ML
3 SUSPENSION/ DROPS OPHTHALMIC EVERY 8 HOURS
Qty: 10 ML | Refills: 0 | Status: SHIPPED | OUTPATIENT
Start: 2024-05-23

## 2024-05-23 RX ORDER — CIPROFLOXACIN 500 MG/1
500 TABLET, FILM COATED ORAL EVERY 12 HOURS
Qty: 14 TABLET | Refills: 0 | Status: SHIPPED | OUTPATIENT
Start: 2024-05-23

## 2024-05-23 NOTE — PROGRESS NOTES
Radha Billingsley is a 59 year old female.    Chief Complaint   Patient presents with    Ear Wax     Patient is here for ear cleaning.    Ear Pain     Patient reports right ear pain x 3 days.       HISTORY OF PRESENT ILLNESS  I have previously seen her for routine ear cleanings.  She presents today with complaints regarding a fullness and discomfort in her right ear canal.  She may have gotten some water in her ear and does have a history of a perforation on that side.  No other signs, symptoms or complaints.     10/26/17 I have seen her in the past for acute episodes of otitis externa on the right.  Known perforation on that side.  She believes she may have gotten some water in her right ear recently.  Now with pain discomfort drainage and muffled hearing.  No other signs, symptoms or complaints.     1/18/18 she presents today with drainage and discomfort on the left ear with decreased hearing.  She typically will have an occasional infective otitis externa on the right side due to a perforation.  Known tube in the left tympanic membrane.  Decreased hearing drainage and some discomfort.  No other new signs, symptoms or complaints.     7/30/19 did well up until a few days ago when she started having some drainage from her right ear.  She is been following strict water precautions.  Known perforation on the right side.     11/5/19 she presents with a history of tubes placed many years ago.  She states that the tube in her left ear has been in place for about 30 years.  Known perforation of the right tympanic membrane.  Last visit she had an infection on the right.  Today she presents with a 5 to 6-day history of drainage without discomfort from the left ear.  She does use water precautions in the form of earplugs but states that even then [place] water gets into her ear on occasion.  She has some of Floxin drops at home.  Has not started them.  No other signs, symptoms or complaints.  No problems with the right ear.      11/21/19 last visit she was started on ofloxacin drops and states that she feels there is still plugged and still not hearing as well.  Some discomfort as well on that same side.  Previously noted to have an old T-tube in place that she is had for about 35 years.  Had some drainage which was cleaned.  No new signs, symptoms or complaints stop drops about a week ago generally help while she was on them either.     12/5/19 last visit she was started on ciprofloxacin tablets and changed to TobraDex eardrops from ofloxacin and she notes complete resolution of her symptoms on the left side.  Once have her right ear checked.  Known perforation on the right old tympanostomy tube on the left.  No further drainage pain discomfort.  Feels that her hearing is reasonably good in both ears.  1/9/20 she did well after being on ciprofloxacin and TobraDex eardrops with complete resolution of her drainage from the left ear.  She has a tube that is been in place for almost 30 years.  Placed by another physician.  I did discuss with her possibly having to remove this tube at some point.  She now presents with several day history of draining and discomfort in that left ear with decreased hearing once again.  She follow strict water precautions.     2/6/20 doing well after removal of old tube that is been present for 30 years.  No further drainage or discomfort in the left ear.  Here for routine follow-up to see if her eardrum is closing.  Known perforation of the right tympanic membrane.  No other signs, symptoms or complaints at this time     5/7/2020 I have seen in the past for chronic problems on the left.  We did remove an old tube that was present for many years.  Finally doing well on the left side with no drainage or other problems.  Known history of stable perforation on the right side following very strict water precautions.  Unfortunately developed drainage from this ear with slight discomfort in the past few days.  No real  change in hearing.  No other signs, symptoms or complaints.  She has used TobraDex eardrops in the past on the left with good resolution of her symptoms.  No complaints or concerns on the left at this time.     6/10/2020 having more issues with decreased hearing on the right.  When she pops her ear it seems to improve her hearing.  Long history of perforation on the right side.  Previous tube which has been in place for many years removed on the left.  No other signs, symptoms or complaints.     11/5/2020 Patient presents for cerumen removal. No other complaints or concerns at this time     12/22/2020 she did well up until about a week ago when she started noticing some drainage from the right ear.  She feels that the left ear is okay.  No other signs, symptoms or complaints.  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  No other signs, symptoms or complaints following strict water precautions.  No recent cold or allergy.     6/22/12 notes some drainage from the right ear and feels both ears are full and hearing is down.  Routinely needs to have her ears cleaned.      9/9/21 having some pain in her cheek with swelling recently.  Seen by her dentist previously and told nothing was wrong with her teeth after films pain came back worse even saw dentist over the weekend without any findings in the last few days.  Still having swelling and to the cheek and tenderness to palpation of her tooth.  Known history of tympanic membrane perforation on the right with recurrent ear infections.  Started some amoxicillin recently for her cheek swelling and discomfort.  No other signs, symptoms or complaints.  She does have a bridge in that area.     9/16./21 she has been on clindamycin for the last week and notes some improvement in her pain and discomfort.  Decreased swelling over that right maxillary area.  CT scan does demonstrate an abnormality:   FACIAL BONES: Small  periapical dental abscess involving tooth # 3 (series 2, image 42) with minimal adjacent buccal cortical surface breakthrough.  No adjacent well-defined/drainable fluid collection.      Appears to have a periapical abscess with some adjacent buccal cortical surface breakthrough consistent with her pain and discomfort over that area.  I did review the CT scan images with her in the office today.  She has been using drops for her ears as well.     2/1/22 continues to have some drainage from her ear using some Ciprodex drops recently.  Still feels somewhat full.  She has not had any problems throughout several months.  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  Follow strict water precautions.  No other signs, symptoms or complaints     6/30/22 Patient presents for cerumen removal. No other complaints or concerns at this time.  No drainage from either ear recently.     1/26/23 here for routine ear cleaning.  Had a sinus infection earlier in the year required Z-Ashok resolved and within 2 to 3 weeks had a recurrence now doing better without new antibiotic but feels congested.  Typically gets her ears cleaned every few months has not been here since June 2022.     4/28/23 Patient presents for cerumen removal. No other complaints or concerns at this time     9/12/23 drainage from the right ear recently.  History of perforation on the right side.  No pain or discomfort but hearing is muffled.      10/6/23 last visit she was started on she says that she she has no further drainage but feels like her ear is somewhat full.  No other signs, symptoms or complaints.     12/1/23 but some drainage in her ear few days ago now presents for evaluation.  Stopped draining on the right side.  Known perforation on that side follows very strict water precautions no other complaints or concerns.     5/23 24 she has been having a little bit of discomfort and hearing  loss on the right side.  No obvious drainage from that ear.  No other signs, symptoms or complaints.  Follows a very strict water precautions bilaterally.  Feels like the left ear needs to be cleared of cerumen      Social History     Socioeconomic History    Marital status:    Tobacco Use    Smoking status: Former     Current packs/day: 0.00     Average packs/day: 0.5 packs/day for 4.0 years (2.0 ttl pk-yrs)     Types: Cigarettes     Start date: 1979     Quit date: 1983     Years since quittin.4    Smokeless tobacco: Never    Tobacco comments:     quit    Vaping Use    Vaping status: Never Used   Substance and Sexual Activity    Alcohol use: Yes     Alcohol/week: 2.0 standard drinks of alcohol     Types: 2 Standard drinks or equivalent per week     Comment: 2 drinks weekly    Drug use: No    Sexual activity: Yes   Other Topics Concern    Caffeine Concern Yes     Comment: 2 cups of coffee     Grew up on a farm No    History of tanning No    Outdoor occupation No    Breast feeding No    Reaction to local anesthetic No    Pt has a pacemaker No    Pt has a defibrillator No       Family History   Problem Relation Age of Onset    Cancer Father         Esophageal    Hypertension Mother     Cancer Maternal Grandfather         Colon cancer       Past Medical History:    Calculus of kidney    Diverticulitis    medication    Esophageal reflux    Taking omeprazole    Essential hypertension    High blood pressure    Migraines    PONV (postoperative nausea and vomiting)    Recurrent acute otitis media       Past Surgical History:   Procedure Laterality Date    Breast biopsy      breast lump           x2    Colonoscopy      Colonoscopy N/A 2023    Procedure: COLONOSCOPY with biopsy and polypectomy;  Surgeon: Sidney Recio MD;  Location: [facility] ENDOSCOPY    Contracept iud  2009    Cyst aspiration left  2010    D & c      x2    Needle biopsy left      Other surgical history       ear surgery    Tonsillectomy           REVIEW OF SYSTEMS    System Neg/Pos Details   Constitutional Negative Fatigue, fever and weight loss.   ENMT Negative Drooling.   Eyes Negative Blurred vision and vision changes.   Respiratory Negative Dyspnea and wheezing.   Cardio Negative Chest pain, irregular heartbeat/palpitations and syncope.   GI Negative Abdominal pain and diarrhea.   Endocrine Negative Cold intolerance and heat intolerance.   Neuro Negative Tremors.   Psych Negative Anxiety and depression.   Integumentary Negative Frequent skin infections, pigment change and rash.   Hema/Lymph Negative Easy bleeding and easy bruising.           PHYSICAL EXAM    There were no vitals taken for this visit.       Constitutional Normal Overall appearance - Normal.   Psychiatric Normal Orientation - Oriented to time, place, person & situation. Appropriate mood and affect.   Neck Exam Normal Inspection - Normal. Palpation - Normal. Parotid gland - Normal. Thyroid gland - Normal.   Eyes Normal Conjunctiva - Right: Normal, Left: Normal. Pupil - Right: Normal, Left: Normal. Fundus - Right: Normal, Left: Normal.   Neurological Normal Memory - Normal. Cranial nerves - Cranial nerves II through XII grossly intact.   Head/Face Normal Facial features - Normal. Eyebrows - Normal. Skull - Normal.        Nasopharynx Normal External nose - Normal. Lips/teeth/gums - Normal. Tonsils - Normal. Oropharynx - Normal.   Ears Normal Inspection - Right: Normal, Left: Normal. Canal - Right: Normal, Left: Normal. TM - Right: Infected left: Normal.   Skin Normal Inspection - Normal.        Lymph Detail Normal Submental. Submandibular. Anterior cervical. Posterior cervical. Supraclavicular.        Nose/Mouth/Throat Normal External nose - Normal. Lips/teeth/gums - Normal. Tonsils - Normal. Oropharynx - Normal.   Nose/Mouth/Throat Normal Nares - Right: Normal Left: Normal. Septum -Normal  Turbinates - Right: Normal, Left: Normal.    Microscopy  Binocular microscopy was performed. The affected ear(s) was/were examined and all debris removed using suction. The findings are described in the physical exam.   Left ear cleared of cerumen impaction normal exam.  Right ear cleaned squamous debris with severely erythematous tympanic membrane noted.    Current Outpatient Medications:     clobetasol 0.05 % External Ointment, Use bid, Disp: 60 g, Rfl: 1    tobramycin-dexamethasone 0.3-0.1 % Ophthalmic Suspension, Place 3 drops in ear(s) every 8 (eight) hours., Disp: 10 mL, Rfl: 0    triamcinolone 0.1 % External Cream, Apply to affected areas twice daily x 2 weeks. Stop for one week. Repeat as needed., Disp: 80 g, Rfl: 0    Omeprazole 40 MG Oral Capsule Delayed Release, Take 1 capsule (40 mg total) by mouth daily. Take 1 capsule by mouth daily before breakfast., Disp: 90 capsule, Rfl: 3    zolpidem (AMBIEN) 5 MG Oral Tab, Take 1 tablet (5 mg total) by mouth nightly as needed for Sleep., Disp: 30 tablet, Rfl: 5    metoprolol succinate ER 50 MG Oral Tablet 24 Hr, Take 1 tablet (50 mg total) by mouth daily., Disp: 90 tablet, Rfl: 3    triamcinolone 0.1 % External Cream, Apply to affected area twice daily x two weeks. Stop for one week, repeat as needed, Disp: 60 g, Rfl: 1  ASSESSMENT AND PLAN    1. Other infective acute otitis externa of right ear  Right ear cleaned of all debris she does have what appears to be an acute otitis externa.  Start Cipro floxacillin tablets as well as TobraDex eardrops for 10 days.  Return to see me in 2 weeks if no improvement.  Continue strict water precautions.  - EAR MICROSCOPY EXAMINATION        This note was prepared using Dragon [employer] voice recognition dictation software. As a result errors may occur. When identified these errors have been corrected. While every attempt is made to correct errors during dictation discrepancies may still exist    Nixon Perera MD    5/23/2024    10:43 AM

## 2024-05-28 RX ORDER — OMEPRAZOLE 40 MG/1
40 CAPSULE, DELAYED RELEASE ORAL DAILY
Qty: 90 CAPSULE | Refills: 1 | Status: SHIPPED | OUTPATIENT
Start: 2024-05-28

## 2024-05-28 NOTE — TELEPHONE ENCOUNTER
Requested Prescriptions     Pending Prescriptions Disp Refills    OMEPRAZOLE 40 MG Oral Capsule Delayed Release [Pharmacy Med Name: OMEPRAZOLE 40MG CAPSULES] 90 capsule 3     Sig: TAKE 1 CAPSULE(40 MG) BY MOUTH DAILY BEFORE BREAKFAST     LOV: 4/10/2023  Last Refill: 06/27/23

## 2024-05-28 NOTE — TELEPHONE ENCOUNTER
Please contact the patient.  I have refilled the prescription for 6 months.  She should be seen in the office in follow-up for further refills.  Alternatively the prescription could be filled by her PCP.  Patient preference.

## 2024-06-04 NOTE — TELEPHONE ENCOUNTER
Left message to call back and schedule appointment.    Call Center if patient returns call, please schedule Office visit with Dr Recio.    Thank you !

## 2024-06-11 ENCOUNTER — OFFICE VISIT (OUTPATIENT)
Dept: FAMILY MEDICINE CLINIC | Facility: CLINIC | Age: 59
End: 2024-06-11
Payer: COMMERCIAL

## 2024-06-11 VITALS
BODY MASS INDEX: 27.94 KG/M2 | DIASTOLIC BLOOD PRESSURE: 82 MMHG | OXYGEN SATURATION: 97 % | SYSTOLIC BLOOD PRESSURE: 128 MMHG | WEIGHT: 178 LBS | HEART RATE: 76 BPM | HEIGHT: 67 IN

## 2024-06-11 DIAGNOSIS — H90.2 CONDUCTIVE HEARING LOSS, MIDDLE EAR: ICD-10-CM

## 2024-06-11 DIAGNOSIS — Z78.0 POST-MENOPAUSAL: ICD-10-CM

## 2024-06-11 DIAGNOSIS — Z00.00 ROUTINE GENERAL MEDICAL EXAMINATION AT A HEALTH CARE FACILITY: Primary | ICD-10-CM

## 2024-06-11 DIAGNOSIS — I10 ESSENTIAL HYPERTENSION: ICD-10-CM

## 2024-06-11 DIAGNOSIS — L30.9 HAND ECZEMA: ICD-10-CM

## 2024-06-11 DIAGNOSIS — Z13.820 OSTEOPOROSIS SCREENING: ICD-10-CM

## 2024-06-11 PROCEDURE — 3008F BODY MASS INDEX DOCD: CPT | Performed by: FAMILY MEDICINE

## 2024-06-11 PROCEDURE — 3079F DIAST BP 80-89 MM HG: CPT | Performed by: FAMILY MEDICINE

## 2024-06-11 PROCEDURE — 99396 PREV VISIT EST AGE 40-64: CPT | Performed by: FAMILY MEDICINE

## 2024-06-11 PROCEDURE — 3074F SYST BP LT 130 MM HG: CPT | Performed by: FAMILY MEDICINE

## 2024-06-11 RX ORDER — METOPROLOL SUCCINATE 50 MG/1
50 TABLET, EXTENDED RELEASE ORAL DAILY
Qty: 90 TABLET | Refills: 3 | Status: SHIPPED | OUTPATIENT
Start: 2024-06-11

## 2024-06-11 NOTE — PROGRESS NOTES
Subjective:   Radha Billingsley is a 59 year old female who presents for Routine Physical (Annual Physical )       History/Other:    Chief Complaint Reviewed and Verified  Nursing Notes Reviewed and   Verified  Tobacco Reviewed  Allergies Reviewed  Problem List Reviewed    Medical History Reviewed  Surgical History Reviewed  Family History   Reviewed  Social History Reviewed         Tobacco:  She smoked tobacco in the past but quit greater than 12 months ago.  Social History     Tobacco Use   Smoking Status Former    Current packs/day: 0.00    Average packs/day: 0.5 packs/day for 4.0 years (2.0 ttl pk-yrs)    Types: Cigarettes    Start date: 1979    Quit date: 1983    Years since quittin.4   Smokeless Tobacco Never   Tobacco Comments    quit         Current Outpatient Medications   Medication Sig Dispense Refill    metoprolol succinate ER 50 MG Oral Tablet 24 Hr Take 1 tablet (50 mg total) by mouth daily. 90 tablet 3    Omeprazole 40 MG Oral Capsule Delayed Release Take 1 capsule (40 mg total) by mouth daily. 90 capsule 1    clobetasol 0.05 % External Ointment Use bid 60 g 1    zolpidem (AMBIEN) 5 MG Oral Tab Take 1 tablet (5 mg total) by mouth nightly as needed for Sleep. 30 tablet 5         Review of Systems:  Review of Systems   Constitutional: Negative.    HENT:  Positive for hearing loss.    Eyes: Negative.    Respiratory: Negative.     Cardiovascular: Negative.    Gastrointestinal: Negative.    Genitourinary: Negative.    Musculoskeletal: Negative.    Skin: Negative.    Neurological: Negative.    Psychiatric/Behavioral: Negative.           Objective:   /82   Pulse 76   Ht 5' 7\" (1.702 m)   Wt 178 lb (80.7 kg)   SpO2 97%   BMI 27.88 kg/m²  Estimated body mass index is 27.88 kg/m² as calculated from the following:    Height as of this encounter: 5' 7\" (1.702 m).    Weight as of this encounter: 178 lb (80.7 kg).  Physical Exam  Vitals reviewed.   Constitutional:        Appearance: Normal appearance. She is well-developed.   HENT:      Head: Normocephalic.      Right Ear: Hearing, tympanic membrane and ear canal normal.      Left Ear: Hearing, tympanic membrane and ear canal normal.      Nose: Nose normal.   Eyes:      Conjunctiva/sclera: Conjunctivae normal.      Pupils: Pupils are equal, round, and reactive to light.      Funduscopic exam:     Right eye: No hemorrhage or AV nicking.         Left eye: No hemorrhage or AV nicking.   Neck:      Thyroid: No thyroid mass or thyromegaly.   Cardiovascular:      Heart sounds: Normal heart sounds.   Pulmonary:      Breath sounds: Normal breath sounds.   Abdominal:      Tenderness: There is no abdominal tenderness.   Musculoskeletal:      Cervical back: Normal range of motion and neck supple.      Lumbar back: Normal.   Lymphadenopathy:      Upper Body:      Right upper body: No supraclavicular adenopathy.      Left upper body: No supraclavicular adenopathy.   Skin:     Comments: No suspicious findings waist up exam   Neurological:      Mental Status: She is alert and oriented to person, place, and time.      Sensory: No sensory deficit.      Deep Tendon Reflexes: Reflexes are normal and symmetric.   Psychiatric:         Mood and Affect: Mood is not anxious or depressed.           Assessment & Plan:   1. Routine general medical examination at a health care facility (Primary)  -     CBC With Differential With Platelet; Future; Expected date: 09/01/2024  -     Comp Metabolic Panel (14); Future; Expected date: 09/01/2024  -     Lipid Panel; Future; Expected date: 09/01/2024  -     Vitamin D; Future; Expected date: 09/01/2024  2. Essential hypertension  3. Conductive hearing loss, middle ear  4. Hand eczema  Other orders  -     Metoprolol Succinate ER; Take 1 tablet (50 mg total) by mouth daily.  Dispense: 90 tablet; Refill: 3    Recommend labs. Refills. CPM.  Encouraged to exercise.     No follow-ups on file.    Thanh Marshall, ,  6/11/2024, 9:30 AM

## 2024-06-13 ENCOUNTER — OFFICE VISIT (OUTPATIENT)
Dept: OTOLARYNGOLOGY | Facility: CLINIC | Age: 59
End: 2024-06-13
Payer: COMMERCIAL

## 2024-06-13 DIAGNOSIS — H92.13 OTORRHEA OF BOTH EARS: Primary | ICD-10-CM

## 2024-06-13 PROCEDURE — 99213 OFFICE O/P EST LOW 20 MIN: CPT | Performed by: OTOLARYNGOLOGY

## 2024-06-13 RX ORDER — OFLOXACIN 3 MG/ML
3 SOLUTION AURICULAR (OTIC) 3 TIMES DAILY
Qty: 10 ML | Refills: 0 | Status: SHIPPED | OUTPATIENT
Start: 2024-06-13

## 2024-06-13 NOTE — PROGRESS NOTES
Radha Billingsley is a 59 year old female.    Chief Complaint   Patient presents with    Follow - Up     Patient is here due to right ear infection follow up. Patient reports drainage from right ear.        HISTORY OF PRESENT ILLNESS  I have previously seen her for routine ear cleanings.  She presents today with complaints regarding a fullness and discomfort in her right ear canal.  She may have gotten some water in her ear and does have a history of a perforation on that side.  No other signs, symptoms or complaints.     10/26/17 I have seen her in the past for acute episodes of otitis externa on the right.  Known perforation on that side.  She believes she may have gotten some water in her right ear recently.  Now with pain discomfort drainage and muffled hearing.  No other signs, symptoms or complaints.     1/18/18 she presents today with drainage and discomfort on the left ear with decreased hearing.  She typically will have an occasional infective otitis externa on the right side due to a perforation.  Known tube in the left tympanic membrane.  Decreased hearing drainage and some discomfort.  No other new signs, symptoms or complaints.     7/30/19 did well up until a few days ago when she started having some drainage from her right ear.  She is been following strict water precautions.  Known perforation on the right side.     11/5/19 she presents with a history of tubes placed many years ago.  She states that the tube in her left ear has been in place for about 30 years.  Known perforation of the right tympanic membrane.  Last visit she had an infection on the right.  Today she presents with a 5 to 6-day history of drainage without discomfort from the left ear.  She does use water precautions in the form of earplugs but states that even then [place] water gets into her ear on occasion.  She has some of Floxin drops at home.  Has not started them.  No other signs, symptoms or complaints.  No problems with the right  ear.     11/21/19 last visit she was started on ofloxacin drops and states that she feels there is still plugged and still not hearing as well.  Some discomfort as well on that same side.  Previously noted to have an old T-tube in place that she is had for about 35 years.  Had some drainage which was cleaned.  No new signs, symptoms or complaints stop drops about a week ago generally help while she was on them either.     12/5/19 last visit she was started on ciprofloxacin tablets and changed to TobraDex eardrops from ofloxacin and she notes complete resolution of her symptoms on the left side.  Once have her right ear checked.  Known perforation on the right old tympanostomy tube on the left.  No further drainage pain discomfort.  Feels that her hearing is reasonably good in both ears.  1/9/20 she did well after being on ciprofloxacin and TobraDex eardrops with complete resolution of her drainage from the left ear.  She has a tube that is been in place for almost 30 years.  Placed by another physician.  I did discuss with her possibly having to remove this tube at some point.  She now presents with several day history of draining and discomfort in that left ear with decreased hearing once again.  She follow strict water precautions.     2/6/20 doing well after removal of old tube that is been present for 30 years.  No further drainage or discomfort in the left ear.  Here for routine follow-up to see if her eardrum is closing.  Known perforation of the right tympanic membrane.  No other signs, symptoms or complaints at this time     5/7/2020 I have seen in the past for chronic problems on the left.  We did remove an old tube that was present for many years.  Finally doing well on the left side with no drainage or other problems.  Known history of stable perforation on the right side following very strict water precautions.  Unfortunately developed drainage from this ear with slight discomfort in the past few days.   No real change in hearing.  No other signs, symptoms or complaints.  She has used TobraDex eardrops in the past on the left with good resolution of her symptoms.  No complaints or concerns on the left at this time.     6/10/2020 having more issues with decreased hearing on the right.  When she pops her ear it seems to improve her hearing.  Long history of perforation on the right side.  Previous tube which has been in place for many years removed on the left.  No other signs, symptoms or complaints.     11/5/2020 Patient presents for cerumen removal. No other complaints or concerns at this time     12/22/2020 she did well up until about a week ago when she started noticing some drainage from the right ear.  She feels that the left ear is okay.  No other signs, symptoms or complaints.  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  No other signs, symptoms or complaints following strict water precautions.  No recent cold or allergy.     6/22/12 notes some drainage from the right ear and feels both ears are full and hearing is down.  Routinely needs to have her ears cleaned.      9/9/21 having some pain in her cheek with swelling recently.  Seen by her dentist previously and told nothing was wrong with her teeth after films pain came back worse even saw dentist over the weekend without any findings in the last few days.  Still having swelling and to the cheek and tenderness to palpation of her tooth.  Known history of tympanic membrane perforation on the right with recurrent ear infections.  Started some amoxicillin recently for her cheek swelling and discomfort.  No other signs, symptoms or complaints.  She does have a bridge in that area.     9/16./21 she has been on clindamycin for the last week and notes some improvement in her pain and discomfort.  Decreased swelling over that right maxillary area.  CT scan does demonstrate an abnormality:   FACIAL BONES:  Small periapical dental abscess involving tooth # 3 (series 2, image 42) with minimal adjacent buccal cortical surface breakthrough.  No adjacent well-defined/drainable fluid collection.      Appears to have a periapical abscess with some adjacent buccal cortical surface breakthrough consistent with her pain and discomfort over that area.  I did review the CT scan images with her in the office today.  She has been using drops for her ears as well.     2/1/22 continues to have some drainage from her ear using some Ciprodex drops recently.  Still feels somewhat full.  She has not had any problems throughout several months.  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  Follow strict water precautions.  No other signs, symptoms or complaints     6/30/22 Patient presents for cerumen removal. No other complaints or concerns at this time.  No drainage from either ear recently.     1/26/23 here for routine ear cleaning.  Had a sinus infection earlier in the year required Z-Ashok resolved and within 2 to 3 weeks had a recurrence now doing better without new antibiotic but feels congested.  Typically gets her ears cleaned every few months has not been here since June 2022.     4/28/23 Patient presents for cerumen removal. No other complaints or concerns at this time     9/12/23 drainage from the right ear recently.  History of perforation on the right side.  No pain or discomfort but hearing is muffled.      10/6/23 last visit she was started on she says that she she has no further drainage but feels like her ear is somewhat full.  No other signs, symptoms or complaints.     12/1/23 but some drainage in her ear few days ago now presents for evaluation.  Stopped draining on the right side.  Known perforation on that side follows very strict water precautions no other complaints or concerns.     5/23 24 she has been having a little bit of discomfort and  hearing loss on the right side.  No obvious drainage from that ear.  No other signs, symptoms or complaints.  Follows a very strict water precautions bilaterally.  Feels like the left ear needs to be cleared of cerumen     24 still continues to have some drainage in the mornings when she awakens no odor no pain no new hearing issues.  Known perforation of the right tympanic membrane with chronic myringitis.      Social History     Socioeconomic History    Marital status:    Tobacco Use    Smoking status: Former     Current packs/day: 0.00     Average packs/day: 0.5 packs/day for 4.0 years (2.0 ttl pk-yrs)     Types: Cigarettes     Start date: 1979     Quit date: 1983     Years since quittin.4    Smokeless tobacco: Never    Tobacco comments:     quit    Vaping Use    Vaping status: Never Used   Substance and Sexual Activity    Alcohol use: Yes     Alcohol/week: 2.0 standard drinks of alcohol     Types: 2 Standard drinks or equivalent per week     Comment: 2 drinks weekly    Drug use: No    Sexual activity: Yes   Other Topics Concern    Caffeine Concern Yes     Comment: 2 cups of coffee     Grew up on a farm No    History of tanning No    Outdoor occupation No    Breast feeding No    Reaction to local anesthetic No    Pt has a pacemaker No    Pt has a defibrillator No       Family History   Problem Relation Age of Onset    Cancer Father         Esophageal    Hypertension Mother     Cancer Maternal Grandfather         Colon cancer       Past Medical History:    Calculus of kidney    Diverticulitis    medication    Esophageal reflux    Taking omeprazole    Essential hypertension    High blood pressure    Migraines    PONV (postoperative nausea and vomiting)    Recurrent acute otitis media       Past Surgical History:   Procedure Laterality Date    Breast biopsy      breast lump           x2    Colonoscopy      Colonoscopy N/A 2023    Procedure: COLONOSCOPY with biopsy and  polypectomy;  Surgeon: Sidney Recio MD;  Location: [facility] ENDOSCOPY    Contracept iud  2009    Cyst aspiration left  01/08/2010    D & c      x2    Needle biopsy left      Other surgical history      ear surgery    Tonsillectomy           REVIEW OF SYSTEMS    System Neg/Pos Details   Constitutional Negative Fatigue, fever and weight loss.   ENMT Negative Drooling.   Eyes Negative Blurred vision and vision changes.   Respiratory Negative Dyspnea and wheezing.   Cardio Negative Chest pain, irregular heartbeat/palpitations and syncope.   GI Negative Abdominal pain and diarrhea.   Endocrine Negative Cold intolerance and heat intolerance.   Neuro Negative Tremors.   Psych Negative Anxiety and depression.   Integumentary Negative Frequent skin infections, pigment change and rash.   Hema/Lymph Negative Easy bleeding and easy bruising.           PHYSICAL EXAM    There were no vitals taken for this visit.       Constitutional Normal Overall appearance - Normal.   Psychiatric Normal Orientation - Oriented to time, place, person & situation. Appropriate mood and affect.   Neck Exam Normal Inspection - Normal. Palpation - Normal. Parotid gland - Normal. Thyroid gland - Normal.   Eyes Normal Conjunctiva - Right: Normal, Left: Normal. Pupil - Right: Normal, Left: Normal. Fundus - Right: Normal, Left: Normal.   Neurological Normal Memory - Normal. Cranial nerves - Cranial nerves II through XII grossly intact.   Head/Face Normal Facial features - Normal. Eyebrows - Normal. Skull - Normal.        Nasopharynx Normal External nose - Normal. Lips/teeth/gums - Normal. Tonsils - Normal. Oropharynx - Normal.   Ears Normal Inspection - Right: Normal, Left: Normal. Canal - Right: Normal, Left: Normal. TM - Right: Slight retraction at the junction of the eardrum and ear canal posteriorly.  Perforation left: Normal.   Skin Normal Inspection - Normal.        Lymph Detail Normal Submental. Submandibular. Anterior cervical. Posterior  cervical. Supraclavicular.        Nose/Mouth/Throat Normal External nose - Normal. Lips/teeth/gums - Normal. Tonsils - Normal. Oropharynx - Normal.   Nose/Mouth/Throat Normal Nares - Right: Normal Left: Normal. Septum -Normal  Turbinates - Right: Normal, Left: Normal.       Current Outpatient Medications:     ofloxacin 0.3 % Otic Solution, Place 3 drops in ear(s) in the morning, at noon, and at bedtime., Disp: 10 mL, Rfl: 0    metoprolol succinate ER 50 MG Oral Tablet 24 Hr, Take 1 tablet (50 mg total) by mouth daily., Disp: 90 tablet, Rfl: 3    Omeprazole 40 MG Oral Capsule Delayed Release, Take 1 capsule (40 mg total) by mouth daily., Disp: 90 capsule, Rfl: 1    clobetasol 0.05 % External Ointment, Use bid, Disp: 60 g, Rfl: 1    zolpidem (AMBIEN) 5 MG Oral Tab, Take 1 tablet (5 mg total) by mouth nightly as needed for Sleep., Disp: 30 tablet, Rfl: 5  ASSESSMENT AND PLAN    1. Otorrhea of both ears  With some mucoid drainage from the posterior aspect of the ear canal at the junction with the eardrum.  Appears she may have a slight retraction in this area.  Unclear if there is any type of cholesteatomatous process going on.  I have asked her to start ofloxacin for 2 weeks return to see me in a twoand 1/2 weeks for reevaluation.  If continued [place] will consider culture and CT scan of the temporal bones for        This note was prepared using Dragon [employer] voice recognition dictation software. As a result errors may occur. When identified these errors have been corrected. While every attempt is made to correct errors during dictation discrepancies may still exist    Nixon Perera MD    6/13/2024    10:10 AM

## 2024-07-02 ENCOUNTER — OFFICE VISIT (OUTPATIENT)
Dept: OTOLARYNGOLOGY | Facility: CLINIC | Age: 59
End: 2024-07-02
Payer: COMMERCIAL

## 2024-07-02 DIAGNOSIS — H65.31 CHRONIC MUCOID OTITIS MEDIA OF RIGHT EAR: Primary | ICD-10-CM

## 2024-07-02 PROCEDURE — 99213 OFFICE O/P EST LOW 20 MIN: CPT | Performed by: OTOLARYNGOLOGY

## 2024-07-02 NOTE — PROGRESS NOTES
Radha Billingsley is a 59 year old female.    Chief Complaint   Patient presents with    Follow - Up     Patient is here due to otorrhea of both ears follow up        HISTORY OF PRESENT ILLNESS  I have previously seen her for routine ear cleanings.  She presents today with complaints regarding a fullness and discomfort in her right ear canal.  She may have gotten some water in her ear and does have a history of a perforation on that side.  No other signs, symptoms or complaints.     10/26/17 I have seen her in the past for acute episodes of otitis externa on the right.  Known perforation on that side.  She believes she may have gotten some water in her right ear recently.  Now with pain discomfort drainage and muffled hearing.  No other signs, symptoms or complaints.     1/18/18 she presents today with drainage and discomfort on the left ear with decreased hearing.  She typically will have an occasional infective otitis externa on the right side due to a perforation.  Known tube in the left tympanic membrane.  Decreased hearing drainage and some discomfort.  No other new signs, symptoms or complaints.     7/30/19 did well up until a few days ago when she started having some drainage from her right ear.  She is been following strict water precautions.  Known perforation on the right side.     11/5/19 she presents with a history of tubes placed many years ago.  She states that the tube in her left ear has been in place for about 30 years.  Known perforation of the right tympanic membrane.  Last visit she had an infection on the right.  Today she presents with a 5 to 6-day history of drainage without discomfort from the left ear.  She does use water precautions in the form of earplugs but states that even then [place] water gets into her ear on occasion.  She has some of Floxin drops at home.  Has not started them.  No other signs, symptoms or complaints.  No problems with the right ear.     11/21/19 last visit she was  started on ofloxacin drops and states that she feels there is still plugged and still not hearing as well.  Some discomfort as well on that same side.  Previously noted to have an old T-tube in place that she is had for about 35 years.  Had some drainage which was cleaned.  No new signs, symptoms or complaints stop drops about a week ago generally help while she was on them either.     12/5/19 last visit she was started on ciprofloxacin tablets and changed to TobraDex eardrops from ofloxacin and she notes complete resolution of her symptoms on the left side.  Once have her right ear checked.  Known perforation on the right old tympanostomy tube on the left.  No further drainage pain discomfort.  Feels that her hearing is reasonably good in both ears.  1/9/20 she did well after being on ciprofloxacin and TobraDex eardrops with complete resolution of her drainage from the left ear.  She has a tube that is been in place for almost 30 years.  Placed by another physician.  I did discuss with her possibly having to remove this tube at some point.  She now presents with several day history of draining and discomfort in that left ear with decreased hearing once again.  She follow strict water precautions.     2/6/20 doing well after removal of old tube that is been present for 30 years.  No further drainage or discomfort in the left ear.  Here for routine follow-up to see if her eardrum is closing.  Known perforation of the right tympanic membrane.  No other signs, symptoms or complaints at this time     5/7/2020 I have seen in the past for chronic problems on the left.  We did remove an old tube that was present for many years.  Finally doing well on the left side with no drainage or other problems.  Known history of stable perforation on the right side following very strict water precautions.  Unfortunately developed drainage from this ear with slight discomfort in the past few days.  No real change in hearing.  No other  signs, symptoms or complaints.  She has used TobraDex eardrops in the past on the left with good resolution of her symptoms.  No complaints or concerns on the left at this time.     6/10/2020 having more issues with decreased hearing on the right.  When she pops her ear it seems to improve her hearing.  Long history of perforation on the right side.  Previous tube which has been in place for many years removed on the left.  No other signs, symptoms or complaints.     11/5/2020 Patient presents for cerumen removal. No other complaints or concerns at this time     12/22/2020 she did well up until about a week ago when she started noticing some drainage from the right ear.  She feels that the left ear is okay.  No other signs, symptoms or complaints.  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  No other signs, symptoms or complaints following strict water precautions.  No recent cold or allergy.     6/22/12 notes some drainage from the right ear and feels both ears are full and hearing is down.  Routinely needs to have her ears cleaned.      9/9/21 having some pain in her cheek with swelling recently.  Seen by her dentist previously and told nothing was wrong with her teeth after films pain came back worse even saw dentist over the weekend without any findings in the last few days.  Still having swelling and to the cheek and tenderness to palpation of her tooth.  Known history of tympanic membrane perforation on the right with recurrent ear infections.  Started some amoxicillin recently for her cheek swelling and discomfort.  No other signs, symptoms or complaints.  She does have a bridge in that area.     9/16./21 she has been on clindamycin for the last week and notes some improvement in her pain and discomfort.  Decreased swelling over that right maxillary area.  CT scan does demonstrate an abnormality:   FACIAL BONES: Small periapical dental abscess  involving tooth # 3 (series 2, image 42) with minimal adjacent buccal cortical surface breakthrough.  No adjacent well-defined/drainable fluid collection.      Appears to have a periapical abscess with some adjacent buccal cortical surface breakthrough consistent with her pain and discomfort over that area.  I did review the CT scan images with her in the office today.  She has been using drops for her ears as well.     2/1/22 continues to have some drainage from her ear using some Ciprodex drops recently.  Still feels somewhat full.  She has not had any problems throughout several months.  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  Follow strict water precautions.  No other signs, symptoms or complaints     6/30/22 Patient presents for cerumen removal. No other complaints or concerns at this time.  No drainage from either ear recently.     1/26/23 here for routine ear cleaning.  Had a sinus infection earlier in the year required Z-Ashok resolved and within 2 to 3 weeks had a recurrence now doing better without new antibiotic but feels congested.  Typically gets her ears cleaned every few months has not been here since June 2022.     4/28/23 Patient presents for cerumen removal. No other complaints or concerns at this time     9/12/23 drainage from the right ear recently.  History of perforation on the right side.  No pain or discomfort but hearing is muffled.      10/6/23 last visit she was started on she says that she she has no further drainage but feels like her ear is somewhat full.  No other signs, symptoms or complaints.     12/1/23 but some drainage in her ear few days ago now presents for evaluation.  Stopped draining on the right side.  Known perforation on that side follows very strict water precautions no other complaints or concerns.     5/23 24 she has been having a little bit of discomfort and hearing loss on the right side.  No  obvious drainage from that ear.  No other signs, symptoms or complaints.  Follows a very strict water precautions bilaterally.  Feels like the left ear needs to be cleared of cerumen     24 still continues to have some drainage in the mornings when she awakens no odor no pain no new hearing issues.  Known perforation of the right tympanic membrane with chronic myringitis.    24 still continues to have drainage from the left ear primarily in the mornings no odor or pain or other issues but she does note that her hearing seems to be getting worse with time.  Known perforation on the right side with chronic myringitis for quite some time.    Social History     Socioeconomic History    Marital status:    Tobacco Use    Smoking status: Former     Current packs/day: 0.00     Average packs/day: 0.5 packs/day for 4.0 years (2.0 ttl pk-yrs)     Types: Cigarettes     Start date: 1979     Quit date: 1983     Years since quittin.5    Smokeless tobacco: Never    Tobacco comments:     quit    Vaping Use    Vaping status: Never Used   Substance and Sexual Activity    Alcohol use: Yes     Alcohol/week: 2.0 standard drinks of alcohol     Types: 2 Standard drinks or equivalent per week     Comment: 2 drinks weekly    Drug use: No    Sexual activity: Yes   Other Topics Concern    Caffeine Concern Yes     Comment: 2 cups of coffee     Grew up on a farm No    History of tanning No    Outdoor occupation No    Breast feeding No    Reaction to local anesthetic No    Pt has a pacemaker No    Pt has a defibrillator No       Family History   Problem Relation Age of Onset    Cancer Father         Esophageal    Hypertension Mother     Cancer Maternal Grandfather         Colon cancer       Past Medical History:    Calculus of kidney    Diverticulitis    medication    Esophageal reflux    Taking omeprazole    Essential hypertension    High blood pressure    Migraines    PONV (postoperative nausea and vomiting)     Recurrent acute otitis media       Past Surgical History:   Procedure Laterality Date    Breast biopsy      breast lump           x2    Colonoscopy      Colonoscopy N/A 2023    Procedure: COLONOSCOPY with biopsy and polypectomy;  Surgeon: Sidney Recio MD;  [facility] ENDOSCOPY    Contracept iud      Cyst aspiration left  2010    D & c      x2    Needle biopsy left      Other surgical history      ear surgery    Tonsillectomy           REVIEW OF SYSTEMS    System Neg/Pos Details   Constitutional Negative Fatigue, fever and weight loss.   ENMT Negative Drooling.   Eyes Negative Blurred vision and vision changes.   Respiratory Negative Dyspnea and wheezing.   Cardio Negative Chest pain, irregular heartbeat/palpitations and syncope.   GI Negative Abdominal pain and diarrhea.   Endocrine Negative Cold intolerance and heat intolerance.   Neuro Negative Tremors.   Psych Negative Anxiety and depression.   Integumentary Negative Frequent skin infections, pigment change and rash.   Hema/Lymph Negative Easy bleeding and easy bruising.           PHYSICAL EXAM    There were no vitals taken for this visit.       Constitutional Normal Overall appearance - Normal.   Psychiatric Normal Orientation - Oriented to time, place, person & situation. Appropriate mood and affect.   Neck Exam Normal Inspection - Normal. Palpation - Normal. Parotid gland - Normal. Thyroid gland - Normal.   Eyes Normal Conjunctiva - Right: Normal, Left: Normal. Pupil - Right: Normal, Left: Normal. Fundus - Right: Normal, Left: Normal.   Neurological Normal Memory - Normal. Cranial nerves - Cranial nerves II through XII grossly intact.   Head/Face Normal Facial features - Normal. Eyebrows - Normal. Skull - Normal.        Nasopharynx Normal External nose - Normal. Lips/teeth/gums - Normal. Tonsils - Normal. Oropharynx - Normal.   Ears Normal Inspection - Right: Normal, Left: Normal. Canal - Right: Normal, Left: Normal. TM  - Right: Mucoid debris on tympanic membrane surface.  Anterior canal squamous material removed with suction.  Difficult to assess if there is a pocket in this region.  Left: Normal.   Skin Normal Inspection - Normal.        Lymph Detail Normal Submental. Submandibular. Anterior cervical. Posterior cervical. Supraclavicular.        Nose/Mouth/Throat Normal External nose - Normal. Lips/teeth/gums - Normal. Tonsils - Normal. Oropharynx - Normal.   Nose/Mouth/Throat Normal Nares - Right: Normal Left: Normal. Septum -Normal  Turbinates - Right: Normal, Left: Normal.       Current Outpatient Medications:     ofloxacin 0.3 % Otic Solution, Place 3 drops in ear(s) in the morning, at noon, and at bedtime., Disp: 10 mL, Rfl: 0    metoprolol succinate ER 50 MG Oral Tablet 24 Hr, Take 1 tablet (50 mg total) by mouth daily., Disp: 90 tablet, Rfl: 3    Omeprazole 40 MG Oral Capsule Delayed Release, Take 1 capsule (40 mg total) by mouth daily., Disp: 90 capsule, Rfl: 1    clobetasol 0.05 % External Ointment, Use bid, Disp: 60 g, Rfl: 1    zolpidem (AMBIEN) 5 MG Oral Tab, Take 1 tablet (5 mg total) by mouth nightly as needed for Sleep., Disp: 30 tablet, Rfl: 5  ASSESSMENT AND PLAN    1. Chronic mucoid otitis media of right ear  Monitor mucoid debris on the tympanic membrane on the right.  I did note some squamous material anteriorly that I was able to suction out.  Unclear if this is a retraction pocket with some debris.  Because of the chronic nature of her problem at this point I have recommended a CT scan of the temporal bones.  She does understand that depending on the findings I may refer her to Dr. OJEDA for further management  - CT TEMPORAL BONES (CPT=70480); Future        This note was prepared using Dragon [employer] voice recognition dictation software. As a result errors may occur. When identified these errors have been corrected. While every attempt is made to correct errors during dictation discrepancies may still  exist    Nixon Perera MD    7/2/2024    9:16 AM

## 2024-07-08 ENCOUNTER — TELEPHONE (OUTPATIENT)
Dept: OTOLARYNGOLOGY | Facility: CLINIC | Age: 59
End: 2024-07-08

## 2024-07-08 NOTE — TELEPHONE ENCOUNTER
Patient states her throat and mouth/tongue on the right side hurts, and has hurt since 7/3 appointment. Per patient, Dr. Perera stated if patient was still in pain he will prescribe an antibiotic. Patient pain is not getting better and would like antibiotic. Please call.

## 2024-07-15 RX ORDER — CIPROFLOXACIN 500 MG/1
500 TABLET, FILM COATED ORAL EVERY 12 HOURS
Qty: 14 TABLET | Refills: 0 | Status: SHIPPED | OUTPATIENT
Start: 2024-07-15

## 2024-07-17 ENCOUNTER — HOSPITAL ENCOUNTER (OUTPATIENT)
Dept: CT IMAGING | Age: 59
Discharge: HOME | End: 2024-07-17
Attending: OTOLARYNGOLOGY
Payer: COMMERCIAL

## 2024-07-17 DIAGNOSIS — H65.31 CHRONIC MUCOID OTITIS MEDIA OF RIGHT EAR: ICD-10-CM

## 2024-07-17 PROCEDURE — 70480 CT ORBIT/EAR/FOSSA W/O DYE: CPT | Performed by: OTOLARYNGOLOGY

## 2024-07-22 ENCOUNTER — TELEPHONE (OUTPATIENT)
Dept: OTOLARYNGOLOGY | Facility: CLINIC | Age: 59
End: 2024-07-22

## 2024-07-23 ENCOUNTER — OFFICE VISIT (OUTPATIENT)
Dept: OTOLARYNGOLOGY | Facility: CLINIC | Age: 59
End: 2024-07-23

## 2024-07-23 DIAGNOSIS — H65.31 CHRONIC MUCOID OTITIS MEDIA OF RIGHT EAR: Primary | ICD-10-CM

## 2024-07-23 PROCEDURE — 99213 OFFICE O/P EST LOW 20 MIN: CPT | Performed by: OTOLARYNGOLOGY

## 2024-07-23 RX ORDER — TOBRAMYCIN AND DEXAMETHASONE 3; 1 MG/ML; MG/ML
3 SUSPENSION/ DROPS OPHTHALMIC EVERY 8 HOURS
Qty: 10 ML | Refills: 0 | Status: SHIPPED | OUTPATIENT
Start: 2024-07-23

## 2024-07-23 NOTE — PROGRESS NOTES
Radha Billingsley is a 59 year old female.    Chief Complaint   Patient presents with    Follow - Up     Patient is here for follow up of ct scan results.       HISTORY OF PRESENT ILLNESS    I have previously seen her for routine ear cleanings.  She presents today with complaints regarding a fullness and discomfort in her right ear canal.  She may have gotten some water in her ear and does have a history of a perforation on that side.  No other signs, symptoms or complaints.     10/26/17 I have seen her in the past for acute episodes of otitis externa on the right.  Known perforation on that side.  She believes she may have gotten some water in her right ear recently.  Now with pain discomfort drainage and muffled hearing.  No other signs, symptoms or complaints.     1/18/18 she presents today with drainage and discomfort on the left ear with decreased hearing.  She typically will have an occasional infective otitis externa on the right side due to a perforation.  Known tube in the left tympanic membrane.  Decreased hearing drainage and some discomfort.  No other new signs, symptoms or complaints.     7/30/19 did well up until a few days ago when she started having some drainage from her right ear.  She is been following strict water precautions.  Known perforation on the right side.     11/5/19 she presents with a history of tubes placed many years ago.  She states that the tube in her left ear has been in place for about 30 years.  Known perforation of the right tympanic membrane.  Last visit she had an infection on the right.  Today she presents with a 5 to 6-day history of drainage without discomfort from the left ear.  She does use water precautions in the form of earplugs but states that even then [place] water gets into her ear on occasion.  She has some of Floxin drops at home.  Has not started them.  No other signs, symptoms or complaints.  No problems with the right ear.     11/21/19 last visit she was  started on ofloxacin drops and states that she feels there is still plugged and still not hearing as well.  Some discomfort as well on that same side.  Previously noted to have an old T-tube in place that she is had for about 35 years.  Had some drainage which was cleaned.  No new signs, symptoms or complaints stop drops about a week ago generally help while she was on them either.     12/5/19 last visit she was started on ciprofloxacin tablets and changed to TobraDex eardrops from ofloxacin and she notes complete resolution of her symptoms on the left side.  Once have her right ear checked.  Known perforation on the right old tympanostomy tube on the left.  No further drainage pain discomfort.  Feels that her hearing is reasonably good in both ears.  1/9/20 she did well after being on ciprofloxacin and TobraDex eardrops with complete resolution of her drainage from the left ear.  She has a tube that is been in place for almost 30 years.  Placed by another physician.  I did discuss with her possibly having to remove this tube at some point.  She now presents with several day history of draining and discomfort in that left ear with decreased hearing once again.  She follow strict water precautions.     2/6/20 doing well after removal of old tube that is been present for 30 years.  No further drainage or discomfort in the left ear.  Here for routine follow-up to see if her eardrum is closing.  Known perforation of the right tympanic membrane.  No other signs, symptoms or complaints at this time     5/7/2020 I have seen in the past for chronic problems on the left.  We did remove an old tube that was present for many years.  Finally doing well on the left side with no drainage or other problems.  Known history of stable perforation on the right side following very strict water precautions.  Unfortunately developed drainage from this ear with slight discomfort in the past few days.  No real change in hearing.  No other  signs, symptoms or complaints.  She has used TobraDex eardrops in the past on the left with good resolution of her symptoms.  No complaints or concerns on the left at this time.     6/10/2020 having more issues with decreased hearing on the right.  When she pops her ear it seems to improve her hearing.  Long history of perforation on the right side.  Previous tube which has been in place for many years removed on the left.  No other signs, symptoms or complaints.     11/5/2020 Patient presents for cerumen removal. No other complaints or concerns at this time     12/22/2020 she did well up until about a week ago when she started noticing some drainage from the right ear.  She feels that the left ear is okay.  No other signs, symptoms or complaints.  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  No other signs, symptoms or complaints following strict water precautions.  No recent cold or allergy.     6/22/12 notes some drainage from the right ear and feels both ears are full and hearing is down.  Routinely needs to have her ears cleaned.      9/9/21 having some pain in her cheek with swelling recently.  Seen by her dentist previously and told nothing was wrong with her teeth after films pain came back worse even saw dentist over the weekend without any findings in the last few days.  Still having swelling and to the cheek and tenderness to palpation of her tooth.  Known history of tympanic membrane perforation on the right with recurrent ear infections.  Started some amoxicillin recently for her cheek swelling and discomfort.  No other signs, symptoms or complaints.  She does have a bridge in that area.     9/16./21 she has been on clindamycin for the last week and notes some improvement in her pain and discomfort.  Decreased swelling over that right maxillary area.  CT scan does demonstrate an abnormality:   FACIAL BONES: Small periapical dental abscess  involving tooth # 3 (series 2, image 42) with minimal adjacent buccal cortical surface breakthrough.  No adjacent well-defined/drainable fluid collection.      Appears to have a periapical abscess with some adjacent buccal cortical surface breakthrough consistent with her pain and discomfort over that area.  I did review the CT scan images with her in the office today.  She has been using drops for her ears as well.     2/1/22 continues to have some drainage from her ear using some Ciprodex drops recently.  Still feels somewhat full.  She has not had any problems throughout several months.  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  Follow strict water precautions.  No other signs, symptoms or complaints     6/30/22 Patient presents for cerumen removal. No other complaints or concerns at this time.  No drainage from either ear recently.     1/26/23 here for routine ear cleaning.  Had a sinus infection earlier in the year required Z-Ashok resolved and within 2 to 3 weeks had a recurrence now doing better without new antibiotic but feels congested.  Typically gets her ears cleaned every few months has not been here since June 2022.     4/28/23 Patient presents for cerumen removal. No other complaints or concerns at this time     9/12/23 drainage from the right ear recently.  History of perforation on the right side.  No pain or discomfort but hearing is muffled.      10/6/23 last visit she was started on she says that she she has no further drainage but feels like her ear is somewhat full.  No other signs, symptoms or complaints.     12/1/23 but some drainage in her ear few days ago now presents for evaluation.  Stopped draining on the right side.  Known perforation on that side follows very strict water precautions no other complaints or concerns.     5/23 24 she has been having a little bit of discomfort and hearing loss on the right side.  No  obvious drainage from that ear.  No other signs, symptoms or complaints.  Follows a very strict water precautions bilaterally.  Feels like the left ear needs to be cleared of cerumen     24 still continues to have some drainage in the mornings when she awakens no odor no pain no new hearing issues.  Known perforation of the right tympanic membrane with chronic myringitis.     24 still continues to have drainage from the left ear primarily in the mornings no odor or pain or other issues but she does note that her hearing seems to be getting worse with time.  Known perforation on the right side with chronic myringitis for quite some time.     24 I suspect her to have a chronic myringitis on the right side.  Feels that her hearing continues to worsen over time.  Here to go over her CT scan images.  These do demonstrate opacification of the middle ear space and thickening of the tympanic membrane as well as a reduction in the number of mastoid air cells due to chronic fluid this is on the right side.  On the left there are very similar findings.  No evidence of cholesteatoma.  Here to discuss further management.    Social History     Socioeconomic History    Marital status:    Tobacco Use    Smoking status: Former     Current packs/day: 0.00     Average packs/day: 0.5 packs/day for 4.0 years (2.0 ttl pk-yrs)     Types: Cigarettes     Start date: 1979     Quit date: 1983     Years since quittin.5    Smokeless tobacco: Never    Tobacco comments:     quit    Vaping Use    Vaping status: Never Used   Substance and Sexual Activity    Alcohol use: Yes     Alcohol/week: 2.0 standard drinks of alcohol     Types: 2 Standard drinks or equivalent per week     Comment: 2 drinks weekly    Drug use: No    Sexual activity: Yes   Other Topics Concern    Caffeine Concern Yes     Comment: 2 cups of coffee     Grew up on a farm No    History of tanning No    Outdoor occupation No    Breast feeding No     Reaction to local anesthetic No    Pt has a pacemaker No    Pt has a defibrillator No       Family History   Problem Relation Age of Onset    Cancer Father         Esophageal    Hypertension Mother     Cancer Maternal Grandfather         Colon cancer       Past Medical History:    Calculus of kidney    Diverticulitis    medication    Esophageal reflux    Taking omeprazole    Essential hypertension    High blood pressure    Migraines    PONV (postoperative nausea and vomiting)    Recurrent acute otitis media       Past Surgical History:   Procedure Laterality Date    Breast biopsy      breast lump           x2    Colonoscopy      Colonoscopy N/A 2023    Procedure: COLONOSCOPY with biopsy and polypectomy;  Surgeon: Sidney Recio MD;  [facility] ENDOSCOPY    Contracept iud  2009    Cyst aspiration left  2010    D & c      x2    Needle biopsy left      Other surgical history      ear surgery    Tonsillectomy           REVIEW OF SYSTEMS    System Neg/Pos Details   Constitutional Negative Fatigue, fever and weight loss.   ENMT Negative Drooling.   Eyes Negative Blurred vision and vision changes.   Respiratory Negative Dyspnea and wheezing.   Cardio Negative Chest pain, irregular heartbeat/palpitations and syncope.   GI Negative Abdominal pain and diarrhea.   Endocrine Negative Cold intolerance and heat intolerance.   Neuro Negative Tremors.   Psych Negative Anxiety and depression.   Integumentary Negative Frequent skin infections, pigment change and rash.   Hema/Lymph Negative Easy bleeding and easy bruising.           PHYSICAL EXAM    There were no vitals taken for this visit.       Constitutional Normal Overall appearance - Normal.   Psychiatric Normal Orientation - Oriented to time, place, person & situation. Appropriate mood and affect.   Neck Exam Normal Inspection - Normal. Palpation - Normal. Parotid gland - Normal. Thyroid gland - Normal.   Eyes Normal Conjunctiva - Right:  Normal, Left: Normal. Pupil - Right: Normal, Left: Normal. Fundus - Right: Normal, Left: Normal.   Neurological Normal Memory - Normal. Cranial nerves - Cranial nerves II through XII grossly intact.   Head/Face Normal Facial features - Normal. Eyebrows - Normal. Skull - Normal.        Nasopharynx Normal External nose - Normal. Lips/teeth/gums - Normal. Tonsils - Normal. Oropharynx - Normal.   Ears Normal Inspection - Right: Normal, Left: Normal. Canal - Right: Normal, Left: Normal. TM - Right:'Currently moist and irritated eardrum no perforation.  Left: Normal.   Skin Normal Inspection - Normal.        Lymph Detail Normal Submental. Submandibular. Anterior cervical. Posterior cervical. Supraclavicular.        Nose/Mouth/Throat Normal External nose - Normal. Lips/teeth/gums - Normal. Tonsils - Normal. Oropharynx - Normal.   Nose/Mouth/Throat Normal Nares - Right: Normal Left: Normal. Septum -Normal  Turbinates - Right: Normal, Left: Normal.       Current Outpatient Medications:     ciprofloxacin 500 MG Oral Tab, Take 1 tablet (500 mg total) by mouth every 12 (twelve) hours., Disp: 14 tablet, Rfl: 0    ofloxacin 0.3 % Otic Solution, Place 3 drops in ear(s) in the morning, at noon, and at bedtime., Disp: 10 mL, Rfl: 0    metoprolol succinate ER 50 MG Oral Tablet 24 Hr, Take 1 tablet (50 mg total) by mouth daily., Disp: 90 tablet, Rfl: 3    Omeprazole 40 MG Oral Capsule Delayed Release, Take 1 capsule (40 mg total) by mouth daily., Disp: 90 capsule, Rfl: 1    clobetasol 0.05 % External Ointment, Use bid, Disp: 60 g, Rfl: 1    zolpidem (AMBIEN) 5 MG Oral Tab, Take 1 tablet (5 mg total) by mouth nightly as needed for Sleep., Disp: 30 tablet, Rfl: 5  ASSESSMENT AND PLAN    1. Chronic mucoid otitis media of right ear  At this point her hearing continues to worsen on the right.  On examination she does have some chronic moisture to the surface of the drum.  Myringitis?  CT scan images were reviewed with the patient in the  office and this does demonstrate thickening of the tympanic membrane on the right side as well as significant reduction in the number of mastoid air cells due to chronic mastoid effusions.  She does have some middle ear fluid I did ask her to start TobraDex eardrops as well as ciprofloxacin for the next week and to meet with Dr. Sylvester to discuss whether there may be a more definitive way to manage her hearing loss.        This note was prepared using Dragon [employer] voice recognition dictation software. As a result errors may occur. When identified these errors have been corrected. While every attempt is made to correct errors during dictation discrepancies may still exist    Nixon Perera MD    7/23/2024    3:38 PM

## 2024-07-23 NOTE — PROGRESS NOTES
NEW PATIENT PROGRESS NOTE  OTOLOGY/OTOLARYNGOLOGY    REF MD:  No referring provider defined for this encounter.     PCP: Thanh Marshall DO    CHIEF COMPLAINT:    Chief Complaint   Patient presents with    Ear Problem     Referred by Dr. Perera       HISTORY OF PRESENT ILLNESS: Radha Billingsley is a 59 year old female who presents for evaluation of worsening hearing on the right side, perforation of the right tympanic membrane with chronic myringitis. The patient has experienced problems with her ears, such as right-sided otalgia, progressive hearing loss, and otorrhea. She has been seeing Dr. Perera for routine ear cleanings and has previously received treatment with Ciprodex drops, TobraDex drops, and Cipro floxacillin tablets. She saw Dr. Perera on 2024 and a recent CT scan of her temporal bones demonstrates thickening of the right tympanic membrane and reduction in the number of mastoid air cells related to chronic mastoid effusions; she was started TobraDex otic drops as well as ciprofloxacin and referral here to discuss further management.        PAST MEDICAL HISTORY:    Past Medical History:    Calculus of kidney    Diverticulitis    medication    Esophageal reflux    Taking omeprazole    Essential hypertension    High blood pressure    Migraines    PONV (postoperative nausea and vomiting)    Recurrent acute otitis media       PAST SURGICAL HISTORY:    Past Surgical History:   Procedure Laterality Date    Breast biopsy      breast lump           x2    Colonoscopy      Colonoscopy N/A 2023    Procedure: COLONOSCOPY with biopsy and polypectomy;  Surgeon: Sidney Recio MD;  Location: [facility] ENDOSCOPY    Contracept iud  2009    Cyst aspiration left  2010    D & c      x2    Needle biopsy left      Other surgical history      ear surgery    Tonsillectomy         Current Outpatient Medications on File Prior to Visit   Medication Sig Dispense Refill    tobramycin-dexamethasone  0.3-0.1 % Ophthalmic Suspension Place 3 drops in ear(s) every 8 (eight) hours. 10 mL 0    ciprofloxacin 500 MG Oral Tab Take 1 tablet (500 mg total) by mouth every 12 (twelve) hours. 14 tablet 0    ofloxacin 0.3 % Otic Solution Place 3 drops in ear(s) in the morning, at noon, and at bedtime. 10 mL 0    metoprolol succinate ER 50 MG Oral Tablet 24 Hr Take 1 tablet (50 mg total) by mouth daily. 90 tablet 3    Omeprazole 40 MG Oral Capsule Delayed Release Take 1 capsule (40 mg total) by mouth daily. 90 capsule 1    clobetasol 0.05 % External Ointment Use bid 60 g 1    zolpidem (AMBIEN) 5 MG Oral Tab Take 1 tablet (5 mg total) by mouth nightly as needed for Sleep. 30 tablet 5     No current facility-administered medications on file prior to visit.       Allergies:   Allergies   Allergen Reactions    Amoxicillin-Pot Clavulanate OTHER (SEE COMMENTS)    Augmentin  [Amoclan] NAUSEA AND VOMITING    Sulfa Antibiotics NAUSEA AND VOMITING    Sulfamethoxazole NAUSEA AND VOMITING    Trimethoprim UNKNOWN    Trimethoprim UNKNOWN       SOCIAL HISTORY:    Social History     Tobacco Use    Smoking status: Former     Current packs/day: 0.00     Average packs/day: 0.5 packs/day for 4.0 years (2.0 ttl pk-yrs)     Types: Cigarettes     Start date: 1979     Quit date: 1983     Years since quittin.6    Smokeless tobacco: Never    Tobacco comments:     quit    Substance Use Topics    Alcohol use: Yes     Alcohol/week: 2.0 standard drinks of alcohol     Types: 2 Standard drinks or equivalent per week     Comment: 2 drinks weekly       FAMILY HISTORY: Denies known family history of hearing loss, tinnitus, vertigo, or migraine.  Denies known family history of head and neck cancer, thyroid cancer, bleeding disorders.     REVIEW OF SYSTEMS:   Positives are in bold  Neuro: Headache, facial weakness, facial numbness, neck pain, vertigo  ENT: Hearing change, tinnitus, otorrhea, otalgia, aural fullness, ear pressure, vertigo,  imbalance  Sinus pressure, rhinorrhea, congestion, facial pain, jaw pain, dysphagia, odynophagia, sore throat, voice changes, shortness of breath    EXAMINATION:  I washed my hands with an alcohol-based hand gel prior to examination  Constitutional:   --Vitals: not currently breastfeeding.  --General: no apparent distress, well-developed, conversant  Psych: affect pleasant and appropriate for age, alert and oriented  Neuro: Facial movement normal bilateral  Eyes: Pupils equal, symmetric and reactive to light.  Extra-ocular muscles intact  Respiratory: No stridor, stertor or increased work of breathing  ENT:  --Ear: The bilateral ears were examined under binocular microscopy  Right ear microscopic exam:  Pinna: Normal, no lesions or masses.  Mastoid: Nontender on palpation.   External auditory canal: With edema and scant otorrhea, no masses or lesions.  Tympanic membrane: Thickened with myringitis, no lesions, normal landmarks.  Middle ear: Aerated.    Left ear microscopic exam:  Pinna: Normal, no lesions or masses.  Mastoid: Nontender on palpation.   External auditory canal: Clear, no masses or lesions.  Tympanic membrane: Thickened, no lesions, normal landmarks.  Middle ear: Aerated.        CT Temporal Bones 7/17/2024 (personally reviewed bilateral partial mastoid opacification and tympanic membrane thickening, minimal sinus disease)  Impression  CONCLUSION:  1. Severe opacification of the right middle ear cavity and right mastoid air cells with an intact right ossicular chain.  These findings are favored to reflect sequela of otomastoiditis.  2. Mild abnormal soft tissue involving the left middle ear cavity predominantly underlying the tympanic membrane and adjacent to the manubrium of the malleus with an intact left ossicular chain.  These findings are also favored to be sequela of  otomastoiditis.  3. Mild abnormal soft tissue thickening involving the bilateral external auditory canals, which has progressed when  compared to 09/14/2021.  These findings may reflect acute or sequela of prior otitis externa.  4. Marked thickening and retraction of the bilateral tympanic membranes.  5. Thinning of the bony coverings of the bilateral superior semicircular canals.    6. Mild multifocal paranasal sinus mucosal thickening with scattered areas of aerosolized secretions, which can reflect acute sinusitis in the appropriate clinical setting.  7. Lesser incidental findings described above.    ASSESSMENT/PLAN:  Radha Billingsley is a 59 year old female with     ICD-10-CM   1. Chronic otitis externa of right ear, unspecified type  H60.61   2. ETD (Eustachian tube dysfunction), bilateral  H69.93        IMPRESSION:  Bilateral chronic eustachian tube dysfunction   Right myringitis and chronic otitis externa   Consider medial canal stenosing fibrosis     PLAN:  -Complete antibiotic course prescribed by Dr. Perera - ciprofloxacin oral and tobradex otic drops   -Once infection resolved we will obtain an audiogram  -CT temporal bone personally reviewed with patient   -Follow-up in 2 weeks     Situation reviewed with the patient in detail.    Quintin Herron MD  Otology/Otolaryngology  [facility] [address] [place] [id number]  Phone [phone]  Fax [phone]

## 2024-07-24 ENCOUNTER — OFFICE VISIT (OUTPATIENT)
Dept: OTOLARYNGOLOGY | Facility: CLINIC | Age: 59
End: 2024-07-24

## 2024-07-24 DIAGNOSIS — H69.93 ETD (EUSTACHIAN TUBE DYSFUNCTION), BILATERAL: ICD-10-CM

## 2024-07-24 DIAGNOSIS — H60.61 CHRONIC OTITIS EXTERNA OF RIGHT EAR, UNSPECIFIED TYPE: Primary | ICD-10-CM

## 2024-07-24 PROCEDURE — 99214 OFFICE O/P EST MOD 30 MIN: CPT | Performed by: OTOLARYNGOLOGY

## 2024-08-02 ENCOUNTER — OFFICE VISIT (OUTPATIENT)
Dept: OTOLARYNGOLOGY | Facility: CLINIC | Age: 59
End: 2024-08-02
Payer: COMMERCIAL

## 2024-08-02 ENCOUNTER — OFFICE VISIT (OUTPATIENT)
Dept: AUDIOLOGY | Facility: CLINIC | Age: 59
End: 2024-08-02

## 2024-08-02 VITALS — WEIGHT: 178 LBS | HEIGHT: 67 IN | BODY MASS INDEX: 27.94 KG/M2

## 2024-08-02 DIAGNOSIS — H60.91 OTITIS EXTERNA OF RIGHT EAR, UNSPECIFIED CHRONICITY, UNSPECIFIED TYPE: Primary | ICD-10-CM

## 2024-08-02 DIAGNOSIS — H90.3 ASYMMETRIC SNHL (SENSORINEURAL HEARING LOSS): ICD-10-CM

## 2024-08-02 DIAGNOSIS — H69.93 CHRONIC EUSTACHIAN TUBE DYSFUNCTION, BILATERAL: ICD-10-CM

## 2024-08-02 DIAGNOSIS — H90.6 MIXED HEARING LOSS, BILATERAL: ICD-10-CM

## 2024-08-02 DIAGNOSIS — H60.8X3 CHRONIC REACTIVE OTITIS EXTERNA OF BOTH EARS: Primary | ICD-10-CM

## 2024-08-02 PROCEDURE — 99214 OFFICE O/P EST MOD 30 MIN: CPT | Performed by: OTOLARYNGOLOGY

## 2024-08-02 PROCEDURE — 92567 TYMPANOMETRY: CPT | Performed by: AUDIOLOGIST

## 2024-08-02 PROCEDURE — 92557 COMPREHENSIVE HEARING TEST: CPT | Performed by: AUDIOLOGIST

## 2024-08-02 PROCEDURE — 3008F BODY MASS INDEX DOCD: CPT | Performed by: OTOLARYNGOLOGY

## 2024-08-02 PROCEDURE — 92504 EAR MICROSCOPY EXAMINATION: CPT | Performed by: OTOLARYNGOLOGY

## 2024-08-02 NOTE — PROGRESS NOTES
PATIENT PROGRESS NOTE  OTOLOGY/OTOLARYNGOLOGY    REF MD:  No referring provider defined for this encounter.     PCP: Thanh Marshall DO    CHIEF COMPLAINT:    Chief Complaint   Patient presents with    Follow - Up     chronic otitis externa of right ear, unspecified type     LAST VISIT 7/24/2024  IMPRESSION:  Bilateral chronic eustachian tube dysfunction   Right myringitis and chronic otitis externa   Consider medial canal stenosing fibrosis     PLAN:  -Complete antibiotic course prescribed by Dr. Perera - ciprofloxacin oral and tobradex otic drops   -Once infection resolved we will obtain an audiogram  -CT temporal bone personally reviewed with patient   -Follow-up in 2 weeks   ________________________________________________________________________________  Interval history: Patient reports that hearing seems to have improved. She is here for a recheck.     HISTORY OF PRESENT ILLNESS: Radha Billingsley is a 59 year old female who presents for evaluation of worsening hearing on the right side, perforation of the right tympanic membrane with chronic myringitis. The patient has experienced problems with her ears, such as right-sided otalgia, progressive hearing loss, and otorrhea. She has been seeing Dr. Perera for routine ear cleanings and has previously received treatment with Ciprodex drops, TobraDex drops, and Cipro floxacillin tablets. She saw Dr. Perera on 7/23/2024 and a recent CT scan of her temporal bones demonstrates thickening of the right tympanic membrane and reduction in the number of mastoid air cells related to chronic mastoid effusions; she was started TobraDex otic drops as well as ciprofloxacin and referral here to discuss further management.        PAST MEDICAL HISTORY:    Past Medical History:    Calculus of kidney    Diverticulitis    medication    Esophageal reflux    Taking omeprazole    Essential hypertension    High blood pressure    Migraines    PONV (postoperative nausea and vomiting)     Recurrent acute otitis media       PAST SURGICAL HISTORY:    Past Surgical History:   Procedure Laterality Date    Breast biopsy      breast lump           x2    Colonoscopy      Colonoscopy N/A 2023    Procedure: COLONOSCOPY with biopsy and polypectomy;  Surgeon: Sidney Recio MD;  Location: [facility] ENDOSCOPY    Contracept iud  2009    Cyst aspiration left  2010    D & c      x2    Needle biopsy left      Other surgical history      ear surgery    Tonsillectomy         Current Outpatient Medications on File Prior to Visit   Medication Sig Dispense Refill    tobramycin-dexamethasone 0.3-0.1 % Ophthalmic Suspension Place 3 drops in ear(s) every 8 (eight) hours. 10 mL 0    ciprofloxacin 500 MG Oral Tab Take 1 tablet (500 mg total) by mouth every 12 (twelve) hours. 14 tablet 0    ofloxacin 0.3 % Otic Solution Place 3 drops in ear(s) in the morning, at noon, and at bedtime. 10 mL 0    metoprolol succinate ER 50 MG Oral Tablet 24 Hr Take 1 tablet (50 mg total) by mouth daily. 90 tablet 3    Omeprazole 40 MG Oral Capsule Delayed Release Take 1 capsule (40 mg total) by mouth daily. 90 capsule 1    clobetasol 0.05 % External Ointment Use bid 60 g 1    zolpidem (AMBIEN) 5 MG Oral Tab Take 1 tablet (5 mg total) by mouth nightly as needed for Sleep. 30 tablet 5     No current facility-administered medications on file prior to visit.       Allergies:   Allergies   Allergen Reactions    Amoxicillin-Pot Clavulanate OTHER (SEE COMMENTS)    Augmentin  [Amoclan] NAUSEA AND VOMITING    Sulfa Antibiotics NAUSEA AND VOMITING    Sulfamethoxazole NAUSEA AND VOMITING    Trimethoprim UNKNOWN    Trimethoprim UNKNOWN       SOCIAL HISTORY:    Social History     Tobacco Use    Smoking status: Former     Current packs/day: 0.00     Average packs/day: 0.5 packs/day for 4.0 years (2.0 ttl pk-yrs)     Types: Cigarettes     Start date: 1979     Quit date: 1983     Years since quittin.6    Smokeless tobacco:  Never    Tobacco comments:     quit 1984   Substance Use Topics    Alcohol use: Yes     Alcohol/week: 2.0 standard drinks of alcohol     Types: 2 Standard drinks or equivalent per week     Comment: 2 drinks weekly       FAMILY HISTORY: Denies known family history of hearing loss, tinnitus, vertigo, or migraine.  Denies known family history of head and neck cancer, thyroid cancer, bleeding disorders.     REVIEW OF SYSTEMS:   Positives are in bold  Neuro: Headache, facial weakness, facial numbness, neck pain, vertigo  ENT: Hearing change, tinnitus, otorrhea, otalgia, aural fullness, ear pressure, vertigo, imbalance  Sinus pressure, rhinorrhea, congestion, facial pain, jaw pain, dysphagia, odynophagia, sore throat, voice changes, shortness of breath    EXAMINATION:  I washed my hands with an alcohol-based hand gel prior to examination  Constitutional:   --Vitals: Height 5' 7\" (1.702 m), weight 178 lb (80.7 kg), not currently breastfeeding.  --General: no apparent distress, well-developed, conversant  Psych: affect pleasant and appropriate for age, alert and oriented  Neuro: Facial movement normal bilateral  Eyes: Pupils equal, symmetric and reactive to light.  Extra-ocular muscles intact  Respiratory: No stridor, stertor or increased work of breathing  ENT:  --Ear: The bilateral ears were examined under binocular microscopy  Right ear microscopic exam:  Pinna: Normal, no lesions or masses.  Mastoid: Nontender on palpation.   External auditory canal: With edema and scant otorrhea, no masses or lesions.  Tympanic membrane: Thickened with myringitis, loss of landmarks. no lesions  Middle ear: Aerated.    Left ear microscopic exam:  Pinna: Normal, no lesions or masses.  Mastoid: Nontender on palpation.   External auditory canal: Clear, no masses or lesions.  Tympanic membrane: Thickened with medial scarring in inferior sulcus, no lesions, normal landmarks.  Middle ear: Aerated.        CT Temporal Bones 7/17/2024  (personally reviewed bilateral partial mastoid opacification and tympanic membrane thickening, minimal sinus disease)  Impression  CONCLUSION:  1. Severe opacification of the right middle ear cavity and right mastoid air cells with an intact right ossicular chain.  These findings are favored to reflect sequela of otomastoiditis.  2. Mild abnormal soft tissue involving the left middle ear cavity predominantly underlying the tympanic membrane and adjacent to the manubrium of the malleus with an intact left ossicular chain.  These findings are also favored to be sequela of  otomastoiditis.  3. Mild abnormal soft tissue thickening involving the bilateral external auditory canals, which has progressed when compared to 09/14/2021.  These findings may reflect acute or sequela of prior otitis externa.  4. Marked thickening and retraction of the bilateral tympanic membranes.  5. Thinning of the bony coverings of the bilateral superior semicircular canals.    6. Mild multifocal paranasal sinus mucosal thickening with scattered areas of aerosolized secretions, which can reflect acute sinusitis in the appropriate clinical setting.  7. Lesser incidental findings described above.    ASSESSMENT/PLAN:  Radha Billingsley is a 59 year old female with     ICD-10-CM   1. Chronic reactive otitis externa of both ears  H60.8X3   2. Asymmetric SNHL (sensorineural hearing loss)  H90.3   3. Chronic Eustachian tube dysfunction, bilateral  H69.93       IMPRESSION:  Bilateral chronic eustachian tube dysfunction   Right myringitis and chronic otitis externa   Consider bilateral medial canal stenosing fibrosis   Asymmetrical SNHL, bilateral mixed hearing loss     PLAN:  -Audiogram reviewed with patient  -I recommend asymmetric sensorineural hearing loss and/or unilateral tinnitus be evaluated with an MRI of the brain and IAC's with gadolinium. This test is medically necessary to assess for retrocochlear pathology such as a vestibular schwannoma.     -CT temporal bone personally reviewed   -Patient has completed antibiotic course prescribed by Dr. Perera - ciprofloxacin oral and tobradex otic drops   -Start Alcohol/vinegar ear flushes - patient educated on use 1:1 mixture of 70% or greater isopropyl alcohol and vinegar. Directions/instruction/schedule were given to the patient. The goal of this treatment is to help dry out the affected ear.   -Follow up in 5 weeks    Situation reviewed with the patient in detail.    Attention: This note has been scribed by Kelly Bryant under the supervision of Quintin Herron MD.     Quintin Herron MD  Otology/Otolaryngology  [address] [place] [address]  Phone [phone]  Fax [phone]      I have personally performed the services described in this documentation. All medical record entries made by the scribe were at my direction and in my presence. I have reviewed the chart and agree that the medical record reflects my personal performance and is accurate and complete.

## 2024-08-02 NOTE — PATIENT INSTRUCTIONS
Alcohol/Vinegar Aural Rinses    Ingredients:   -White vinegar   -70% or higher isopropyl alcohol (rubbing alcohol). Lower concentrations will not work.  -Make a mixture of the above ingredients that is 50% vinegar and 50% alcohol. If you prepare it in advance, shake it before use.  -A syringe (without a needle) will be given to you to help with irrigations. Additional syringes or bulbs can be purchased at a pharmacy. The ideal size is 30mL (1 ounce).   -Body temperature ideal for the mixture, room temperature is ok for most people. Using the mixture if it is hot or cold will make you dizzy. If you get dizzy it will pass within a few minutes.     Instructions:   -The goal of this treatment is to help dry out the ears. The rinses “flush” out the ears. Do not try to keep the mixture of alcohol and vinegar in the ear as the defeats the purposes of using the rinse to “flush” the ear out.   -Perform the first rinse seated to ensure you do not get dizzy.   -Some patients prefer to do the rinse after or before showering to contain the mess. It can also be done over the sink or a basin with your ear tipped to the side.     -Rinse the affected ear with 3 syringefuls 1-2 times daily for 2 weeks.   -Rinse the affected ear with 3 syringefuls 1 time every other day for 2 weeks.   -Rinse the affected ear with 3 syringefuls 2 times weekly.     -Follow dry ear precautions apart from this rinse as directed by your physician.

## 2024-08-05 ENCOUNTER — HOSPITAL ENCOUNTER (OUTPATIENT)
Dept: BONE DENSITY | Age: 59
Discharge: HOME | End: 2024-08-05
Attending: FAMILY MEDICINE
Payer: COMMERCIAL

## 2024-08-05 DIAGNOSIS — Z78.0 POST-MENOPAUSAL: ICD-10-CM

## 2024-08-05 DIAGNOSIS — Z13.820 OSTEOPOROSIS SCREENING: ICD-10-CM

## 2024-08-05 PROCEDURE — 77080 DXA BONE DENSITY AXIAL: CPT | Performed by: FAMILY MEDICINE

## 2024-09-09 ENCOUNTER — HOSPITAL ENCOUNTER (OUTPATIENT)
Dept: MRI IMAGING | Age: 59
Discharge: HOME | End: 2024-09-09
Attending: OTOLARYNGOLOGY
Payer: COMMERCIAL

## 2024-09-09 DIAGNOSIS — H90.3 ASYMMETRIC SNHL (SENSORINEURAL HEARING LOSS): ICD-10-CM

## 2024-09-09 PROCEDURE — 70553 MRI BRAIN STEM W/O & W/DYE: CPT | Performed by: OTOLARYNGOLOGY

## 2024-09-09 PROCEDURE — A9575 INJ GADOTERATE MEGLUMI 0.1ML: HCPCS | Performed by: OTOLARYNGOLOGY

## 2024-09-09 RX ORDER — GADOTERATE MEGLUMINE 376.9 MG/ML
20 INJECTION INTRAVENOUS
Status: COMPLETED | OUTPATIENT
Start: 2024-09-09 | End: 2024-09-09

## 2024-09-09 RX ADMIN — GADOTERATE MEGLUMINE 17 ML: 376.9 INJECTION INTRAVENOUS at 14:37:00

## 2024-09-11 ENCOUNTER — OFFICE VISIT (OUTPATIENT)
Dept: OTOLARYNGOLOGY | Facility: CLINIC | Age: 59
End: 2024-09-11
Payer: COMMERCIAL

## 2024-09-11 VITALS
BODY MASS INDEX: 27.62 KG/M2 | TEMPERATURE: 97 F | HEART RATE: 72 BPM | RESPIRATION RATE: 18 BRPM | HEIGHT: 67 IN | OXYGEN SATURATION: 99 % | WEIGHT: 176 LBS

## 2024-09-11 DIAGNOSIS — H69.93 CHRONIC EUSTACHIAN TUBE DYSFUNCTION, BILATERAL: ICD-10-CM

## 2024-09-11 DIAGNOSIS — H65.31 CHRONIC MUCOID OTITIS MEDIA OF RIGHT EAR: ICD-10-CM

## 2024-09-11 DIAGNOSIS — H60.8X3 CHRONIC REACTIVE OTITIS EXTERNA OF BOTH EARS: Primary | ICD-10-CM

## 2024-09-11 PROCEDURE — 99214 OFFICE O/P EST MOD 30 MIN: CPT | Performed by: OTOLARYNGOLOGY

## 2024-09-11 PROCEDURE — 3008F BODY MASS INDEX DOCD: CPT | Performed by: OTOLARYNGOLOGY

## 2024-09-11 NOTE — PROGRESS NOTES
PATIENT PROGRESS NOTE  OTOLOGY/OTOLARYNGOLOGY    REF MD:  No referring provider defined for this encounter.     PCP: Thanh Marshall DO    CHIEF COMPLAINT:    No chief complaint on file.    LAST VISIT 8/02/2024    IMPRESSION:  Bilateral chronic eustachian tube dysfunction   Right myringitis and chronic otitis externa   Consider bilateral medial canal stenosing fibrosis   Asymmetrical SNHL, bilateral mixed hearing loss     PLAN:  -Audiogram reviewed with patient  -I recommend asymmetric sensorineural hearing loss and/or unilateral tinnitus be evaluated with an MRI of the brain and IAC's with gadolinium. This test is medically necessary to assess for retrocochlear pathology such as a vestibular schwannoma.    -CT temporal bone personally reviewed   -Patient has completed antibiotic course prescribed by Dr. Perera - ciprofloxacin oral and tobradex otic drops   -Start Alcohol/vinegar ear flushes - patient educated on use 1:1 mixture of 70% or greater isopropyl alcohol and vinegar. Directions/instruction/schedule were given to the patient. The goal of this treatment is to help dry out the affected ear.   -Follow up in 5 weeks  ________________________________________________________________________________  Interval history:  Has been following instructions. Here for ear recheck.     HISTORY OF PRESENT ILLNESS: Radha Billingsley is a 59 year old female who presents for evaluation of worsening hearing on the right side, perforation of the right tympanic membrane with chronic myringitis. The patient has experienced problems with her ears, such as right-sided otalgia, progressive hearing loss, and otorrhea. She has been seeing Dr. Perera for routine ear cleanings and has previously received treatment with Ciprodex drops, TobraDex drops, and Cipro floxacillin tablets. She saw Dr. Perera on 7/23/2024 and a recent CT scan of her temporal bones demonstrates thickening of the right tympanic membrane and reduction in the number of  mastoid air cells related to chronic mastoid effusions; she was started TobraDex otic drops as well as ciprofloxacin and referral here to discuss further management.        PAST MEDICAL HISTORY:    Past Medical History:    Calculus of kidney    Diverticulitis    medication    Esophageal reflux    Taking omeprazole    Essential hypertension    High blood pressure    Migraines    PONV (postoperative nausea and vomiting)    Recurrent acute otitis media       PAST SURGICAL HISTORY:    Past Surgical History:   Procedure Laterality Date    Breast biopsy      breast lump           x2    Colonoscopy      Colonoscopy N/A 2023    Procedure: COLONOSCOPY with biopsy and polypectomy;  Surgeon: Sidney Recio MD;  Location: [facility] ENDOSCOPY    Contracept iud  2009    Cyst aspiration left  2010    D & c      x2    Needle biopsy left      Other surgical history      ear surgery    Tonsillectomy         Current Outpatient Medications on File Prior to Visit   Medication Sig Dispense Refill    tobramycin-dexamethasone 0.3-0.1 % Ophthalmic Suspension Place 3 drops in ear(s) every 8 (eight) hours. 10 mL 0    ciprofloxacin 500 MG Oral Tab Take 1 tablet (500 mg total) by mouth every 12 (twelve) hours. 14 tablet 0    ofloxacin 0.3 % Otic Solution Place 3 drops in ear(s) in the morning, at noon, and at bedtime. 10 mL 0    metoprolol succinate ER 50 MG Oral Tablet 24 Hr Take 1 tablet (50 mg total) by mouth daily. 90 tablet 3    Omeprazole 40 MG Oral Capsule Delayed Release Take 1 capsule (40 mg total) by mouth daily. 90 capsule 1    clobetasol 0.05 % External Ointment Use bid 60 g 1    zolpidem (AMBIEN) 5 MG Oral Tab Take 1 tablet (5 mg total) by mouth nightly as needed for Sleep. 30 tablet 5     No current facility-administered medications on file prior to visit.       Allergies:   Allergies   Allergen Reactions    Amoxicillin-Pot Clavulanate OTHER (SEE COMMENTS)    Augmentin  [Amoclan] NAUSEA AND VOMITING     Sulfa Antibiotics NAUSEA AND VOMITING    Sulfamethoxazole NAUSEA AND VOMITING    Trimethoprim UNKNOWN    Trimethoprim UNKNOWN       SOCIAL HISTORY:    Social History     Tobacco Use    Smoking status: Former     Current packs/day: 0.00     Average packs/day: 0.5 packs/day for 4.0 years (2.0 ttl pk-yrs)     Types: Cigarettes     Start date: 1979     Quit date: 1983     Years since quittin.7    Smokeless tobacco: Never    Tobacco comments:     quit    Substance Use Topics    Alcohol use: Yes     Alcohol/week: 2.0 standard drinks of alcohol     Types: 2 Standard drinks or equivalent per week     Comment: 2 drinks weekly       FAMILY HISTORY: Denies known family history of hearing loss, tinnitus, vertigo, or migraine.  Denies known family history of head and neck cancer, thyroid cancer, bleeding disorders.     REVIEW OF SYSTEMS:   Positives are in bold  Neuro: Headache, facial weakness, facial numbness, neck pain, vertigo  ENT: Hearing change, tinnitus, otorrhea, otalgia, aural fullness, ear pressure, vertigo, imbalance  Sinus pressure, rhinorrhea, congestion, facial pain, jaw pain, dysphagia, odynophagia, sore throat, voice changes, shortness of breath    EXAMINATION:  I washed my hands with an alcohol-based hand gel prior to examination  Constitutional:   --Vitals: not currently breastfeeding.  --General: no apparent distress, well-developed, conversant  Psych: affect pleasant and appropriate for age, alert and oriented  Neuro: Facial movement normal bilateral  Eyes: Pupils equal, symmetric and reactive to light.  Extra-ocular muscles intact  Respiratory: No stridor, stertor or increased work of breathing  ENT:  --Ear: The bilateral ears were examined under binocular microscopy  Right ear microscopic exam:  Pinna: Normal, no lesions or masses.  Mastoid: Nontender on palpation.   External auditory canal: With  minimal debris, no masses or lesions.  Tympanic membrane: Thickened, but there is an inferior  thin area  Middle ear: serous otitis media.    Left ear microscopic exam:  Pinna: Normal, no lesions or masses.  Mastoid: Nontender on palpation.   External auditory canal: Clear, no masses or lesions.  Tympanic membrane: Thickened with medial scarring in inferior sulcus, no lesions, loss of landmarks.  Middle ear: Difficult to assess.    CT Temporal Bones 7/17/2024 (personally reviewed bilateral partial mastoid opacification and tympanic membrane thickening, minimal sinus disease)  Impression  CONCLUSION:  1. Severe opacification of the right middle ear cavity and right mastoid air cells with an intact right ossicular chain.  These findings are favored to reflect sequela of otomastoiditis.  2. Mild abnormal soft tissue involving the left middle ear cavity predominantly underlying the tympanic membrane and adjacent to the manubrium of the malleus with an intact left ossicular chain.  These findings are also favored to be sequela of  otomastoiditis.  3. Mild abnormal soft tissue thickening involving the bilateral external auditory canals, which has progressed when compared to 09/14/2021.  These findings may reflect acute or sequela of prior otitis externa.  4. Marked thickening and retraction of the bilateral tympanic membranes.  5. Thinning of the bony coverings of the bilateral superior semicircular canals.    6. Mild multifocal paranasal sinus mucosal thickening with scattered areas of aerosolized secretions, which can reflect acute sinusitis in the appropriate clinical setting.  7. Lesser incidental findings described above.    ASSESSMENT/PLAN:  Radha Billingsley is a 59 year old female with   No diagnosis found.      IMPRESSION:  Bilateral chronic eustachian tube dysfunction   Right myringitis and chronic otitis externa   Right chronic otitis media  Consider bilateral medial canal stenosing fibrosis   Asymmetrical SNHL, bilateral mixed hearing loss     PLAN:  -Audiogram reviewed with patient  -CT temporal bone  reviewed with patient   -I recommend asymmetric sensorineural hearing loss and/or unilateral tinnitus be evaluated with an MRI of the brain and IAC's with gadolinium. This test is medically necessary to assess for retrocochlear pathology such as a vestibular schwannoma.    -CT temporal bone personally reviewed   -Patient has completed antibiotic course prescribed by Dr. Perera - ciprofloxacin oral and tobradex otic drops   -Continue Alcohol/vinegar ear flushes - patient educated on use 1:1 mixture of 70% or greater isopropyl alcohol and vinegar. Directions/instruction/schedule were given to the patient. The goal of this treatment is to help dry out the affected ear.   -Consider right PE tube placement at follow-up. Might not be possible due to TM scarring/blunting   -Follow up in 3 weeks    Situation reviewed with the patient in detail.    Attention: This note has been scribed by Kelly Bryant under the supervision of Quintin Herron MD.     Quintin Herron MD  Otology/Otolaryngology  [address] [place] [address]  Phone [phone]  Fax [phone]      I have personally performed the services described in this documentation. All medical record entries made by the scribe were at my direction and in my presence. I have reviewed the chart and agree that the medical record reflects my personal performance and is accurate and complete.

## 2024-09-30 ENCOUNTER — OFFICE VISIT (OUTPATIENT)
Dept: OTOLARYNGOLOGY | Facility: CLINIC | Age: 59
End: 2024-09-30
Payer: COMMERCIAL

## 2024-09-30 VITALS
HEART RATE: 74 BPM | WEIGHT: 176 LBS | OXYGEN SATURATION: 98 % | BODY MASS INDEX: 27.62 KG/M2 | RESPIRATION RATE: 18 BRPM | HEIGHT: 67 IN | TEMPERATURE: 98 F

## 2024-09-30 DIAGNOSIS — H65.31 CHRONIC MUCOID OTITIS MEDIA OF RIGHT EAR: ICD-10-CM

## 2024-09-30 DIAGNOSIS — H69.93 CHRONIC EUSTACHIAN TUBE DYSFUNCTION, BILATERAL: ICD-10-CM

## 2024-09-30 DIAGNOSIS — H90.3 ASYMMETRIC SNHL (SENSORINEURAL HEARING LOSS): ICD-10-CM

## 2024-09-30 DIAGNOSIS — H60.8X3 CHRONIC REACTIVE OTITIS EXTERNA OF BOTH EARS: Primary | ICD-10-CM

## 2024-09-30 PROCEDURE — 99214 OFFICE O/P EST MOD 30 MIN: CPT | Performed by: OTOLARYNGOLOGY

## 2024-09-30 PROCEDURE — 3008F BODY MASS INDEX DOCD: CPT | Performed by: OTOLARYNGOLOGY

## 2024-09-30 PROCEDURE — 92504 EAR MICROSCOPY EXAMINATION: CPT | Performed by: OTOLARYNGOLOGY

## 2024-09-30 NOTE — PROGRESS NOTES
PATIENT PROGRESS NOTE  OTOLOGY/OTOLARYNGOLOGY    REF MD:  No referring provider defined for this encounter.     PCP: Thanh Marshall DO    CHIEF COMPLAINT:    Chief Complaint   Patient presents with    Follow - Up     Patient here for otitis externa of both ears     LAST VISIT 8/02/2024    IMPRESSION:  Bilateral chronic eustachian tube dysfunction   Right myringitis and chronic otitis externa   Consider bilateral medial canal stenosing fibrosis   Asymmetrical SNHL, bilateral mixed hearing loss     PLAN:  -Audiogram reviewed with patient  -I recommend asymmetric sensorineural hearing loss and/or unilateral tinnitus be evaluated with an MRI of the brain and IAC's with gadolinium. This test is medically necessary to assess for retrocochlear pathology such as a vestibular schwannoma.    -CT temporal bone personally reviewed   -Patient has completed antibiotic course prescribed by Dr. Perera - ciprofloxacin oral and tobradex otic drops   -Start Alcohol/vinegar ear flushes - patient educated on use 1:1 mixture of 70% or greater isopropyl alcohol and vinegar. Directions/instruction/schedule were given to the patient. The goal of this treatment is to help dry out the affected ear.   -Follow up in 5 weeks  ________________________________________________________________________________  Interval history:  Has been following instructions. Here for ear recheck.     HISTORY OF PRESENT ILLNESS: Radha Billingsley is a 59 year old female who presents for evaluation of worsening hearing on the right side, perforation of the right tympanic membrane with chronic myringitis. The patient has experienced problems with her ears, such as right-sided otalgia, progressive hearing loss, and otorrhea. She has been seeing Dr. Perera for routine ear cleanings and has previously received treatment with Ciprodex drops, TobraDex drops, and Cipro floxacillin tablets. She saw Dr. Perera on 7/23/2024 and a recent CT scan of her temporal bones  demonstrates thickening of the right tympanic membrane and reduction in the number of mastoid air cells related to chronic mastoid effusions; she was started TobraDex otic drops as well as ciprofloxacin and referral here to discuss further management.        PAST MEDICAL HISTORY:    Past Medical History:    Calculus of kidney    Diverticulitis    medication    Esophageal reflux    Taking omeprazole    Essential hypertension    High blood pressure    Migraines    PONV (postoperative nausea and vomiting)    Recurrent acute otitis media       PAST SURGICAL HISTORY:    Past Surgical History:   Procedure Laterality Date    Breast biopsy      breast lump           x2    Colonoscopy      Colonoscopy N/A 2023    Procedure: COLONOSCOPY with biopsy and polypectomy;  Surgeon: Sidney Recio MD;  Location: [facility] ENDOSCOPY    [facility] iud      Cyst aspiration left  2010    D & c      x2    Needle biopsy left      Other surgical history      ear surgery    Tonsillectomy         Current Outpatient Medications on File Prior to Visit   Medication Sig Dispense Refill    metoprolol succinate ER 50 MG Oral Tablet 24 Hr Take 1 tablet (50 mg total) by mouth daily. 90 tablet 3    Omeprazole 40 MG Oral Capsule Delayed Release Take 1 capsule (40 mg total) by mouth daily. 90 capsule 1    clobetasol 0.05 % External Ointment Use bid 60 g 1    zolpidem (AMBIEN) 5 MG Oral Tab Take 1 tablet (5 mg total) by mouth nightly as needed for Sleep. 30 tablet 5    tobramycin-dexamethasone 0.3-0.1 % Ophthalmic Suspension Place 3 drops in ear(s) every 8 (eight) hours. 10 mL 0    ciprofloxacin 500 MG Oral Tab Take 1 tablet (500 mg total) by mouth every 12 (twelve) hours. 14 tablet 0    ofloxacin 0.3 % Otic Solution Place 3 drops in ear(s) in the morning, at noon, and at bedtime. 10 mL 0     No current facility-administered medications on file prior to visit.       Allergies:   Allergies   Allergen Reactions     Amoxicillin-Pot Clavulanate OTHER (SEE COMMENTS)    Augmentin  [Amoclan] NAUSEA AND VOMITING    Sulfa Antibiotics NAUSEA AND VOMITING    Sulfamethoxazole NAUSEA AND VOMITING    Trimethoprim UNKNOWN    Trimethoprim UNKNOWN       SOCIAL HISTORY:    Social History     Tobacco Use    Smoking status: Former     Current packs/day: 0.00     Average packs/day: 0.5 packs/day for 4.0 years (2.0 ttl pk-yrs)     Types: Cigarettes     Start date: 1979     Quit date: 1983     Years since quittin.7    Smokeless tobacco: Never    Tobacco comments:     quit    Substance Use Topics    Alcohol use: Yes     Alcohol/week: 2.0 standard drinks of alcohol     Types: 2 Standard drinks or equivalent per week     Comment: 2 drinks weekly       FAMILY HISTORY: Denies known family history of hearing loss, tinnitus, vertigo, or migraine.  Denies known family history of head and neck cancer, thyroid cancer, bleeding disorders.     REVIEW OF SYSTEMS:   Positives are in bold  Neuro: Headache, facial weakness, facial numbness, neck pain, vertigo  ENT: Hearing change, tinnitus, otorrhea, otalgia, aural fullness, ear pressure, vertigo, imbalance  Sinus pressure, rhinorrhea, congestion, facial pain, jaw pain, dysphagia, odynophagia, sore throat, voice changes, shortness of breath    EXAMINATION:  I washed my hands with an alcohol-based hand gel prior to examination  Constitutional:   --Vitals: Pulse 74, temperature 98.4 °F (36.9 °C), temperature source Tympanic, resp. rate 18, height 5' 7\" (1.702 m), weight 176 lb (79.8 kg), SpO2 98%, not currently breastfeeding.  --General: no apparent distress, well-developed, conversant  Psych: affect pleasant and appropriate for age, alert and oriented  Neuro: Facial movement normal bilateral  Eyes: Pupils equal, symmetric and reactive to light.  Extra-ocular muscles intact  Respiratory: No stridor, stertor or increased work of breathing  ENT:  --Ear: The bilateral ears were examined under  binocular microscopy  Right ear microscopic exam:  Pinna: Normal, no lesions or masses.  Mastoid: Nontender on palpation.   External auditory canal: With  minimal debris, no masses or lesions.  Tympanic membrane: Thickened, but there is an inferior thin area  Middle ear: serous otitis media.    Left ear microscopic exam:  Pinna: Normal, no lesions or masses.  Mastoid: Nontender on palpation.   External auditory canal: Clear, no masses or lesions.  Tympanic membrane: Thickened with medial scarring in inferior sulcus, no lesions, loss of landmarks.  Middle ear: Difficult to assess.    CT Temporal Bones 7/17/2024 (personally reviewed bilateral partial mastoid opacification and tympanic membrane thickening, minimal sinus disease)  Impression  CONCLUSION:  1. Severe opacification of the right middle ear cavity and right mastoid air cells with an intact right ossicular chain.  These findings are favored to reflect sequela of otomastoiditis.  2. Mild abnormal soft tissue involving the left middle ear cavity predominantly underlying the tympanic membrane and adjacent to the manubrium of the malleus with an intact left ossicular chain.  These findings are also favored to be sequela of  otomastoiditis.  3. Mild abnormal soft tissue thickening involving the bilateral external auditory canals, which has progressed when compared to 09/14/2021.  These findings may reflect acute or sequela of prior otitis externa.  4. Marked thickening and retraction of the bilateral tympanic membranes.  5. Thinning of the bony coverings of the bilateral superior semicircular canals.    6. Mild multifocal paranasal sinus mucosal thickening with scattered areas of aerosolized secretions, which can reflect acute sinusitis in the appropriate clinical setting.  7. Lesser incidental findings described above.    MRI IAC ww/o contrast 9/9/2024 (no retrocochlear masses, right mastoid and middle ear opacification)  Impression   CONCLUSION:  1. Negative MRI  of the IAC's with gadolinium.  2. No cerebellar pontine angle mass.  3. Right mastoid effusions suggest mastoiditis..  4. Bilobed mucous retention cyst versus mucocele in the right sphenoid sinus measures 2.3 x 1.8 x 1.0 cm.        ASSESSMENT/PLAN:  Radha Billingsley is a 59 year old female with     ICD-10-CM   1. Chronic reactive otitis externa of both ears  H60.8X3   2. Chronic Eustachian tube dysfunction, bilateral  H69.93   3. Chronic mucoid otitis media of right ear  H65.31   4. Asymmetric SNHL (sensorineural hearing loss)  H90.3         IMPRESSION:  Bilateral chronic eustachian tube dysfunction   Right myringitis and chronic otitis externa - resolved   Right chronic otitis media  Bilateral medial canal stenosing fibrosis   Asymmetrical SNHL, bilateral mixed hearing loss - MRI IAC ww/o contrast normal    PLAN:  -Audiogram reviewed with patient  -CT temporal bone reviewed with patient   -MRI IAC ww/o contrast normal, reviewed with patient. Shows right middle ear and mastoid opacification.  -CT temporal bone personally reviewed   -Consider right PE tube placement or eustachian tube balloon dilation. PE tube might not be possible due to TM scarring/blunting. Patient would like to observe for now.   -Follow up in 6 months    Situation reviewed with the patient in detail.    Attention: This note has been scribed by Kelly Bryant under the supervision of Quintin Herron MD.     Quintin Herron MD  Otology/Otolaryngology  [address] [place] [id number]  Phone [phone]  Fax [phone]      I have personally performed the services described in this documentation. All medical record entries made by the scribe were at my direction and in my presence. I have reviewed the chart and agree that the medical record reflects my personal performance and is accurate and complete.

## 2024-10-21 ENCOUNTER — OFFICE VISIT (OUTPATIENT)
Dept: OTOLARYNGOLOGY | Facility: CLINIC | Age: 59
End: 2024-10-21

## 2024-10-21 DIAGNOSIS — H60.391 OTHER INFECTIVE ACUTE OTITIS EXTERNA OF RIGHT EAR: Primary | ICD-10-CM

## 2024-10-21 DIAGNOSIS — H69.93 CHRONIC EUSTACHIAN TUBE DYSFUNCTION, BILATERAL: ICD-10-CM

## 2024-10-21 DIAGNOSIS — H90.3 ASYMMETRIC SNHL (SENSORINEURAL HEARING LOSS): ICD-10-CM

## 2024-10-21 DIAGNOSIS — H60.8X3 CHRONIC REACTIVE OTITIS EXTERNA OF BOTH EARS: ICD-10-CM

## 2024-10-21 PROCEDURE — 99213 OFFICE O/P EST LOW 20 MIN: CPT | Performed by: OTOLARYNGOLOGY

## 2024-10-21 PROCEDURE — 92504 EAR MICROSCOPY EXAMINATION: CPT | Performed by: OTOLARYNGOLOGY

## 2024-10-21 NOTE — PROGRESS NOTES
PATIENT PROGRESS NOTE  OTOLOGY/OTOLARYNGOLOGY    REF MD:  No referring provider defined for this encounter.     PCP: Thanh Marshall DO    CHIEF COMPLAINT:    Chief Complaint   Patient presents with    Follow - Up     Right ear clogged     LAST VISIT - 9/30/2024    IMPRESSION:  Bilateral chronic eustachian tube dysfunction   Right myringitis and chronic otitis externa - resolved   Right chronic otitis media  Bilateral medial canal stenosing fibrosis   Asymmetrical SNHL, bilateral mixed hearing loss - MRI IAC ww/o contrast normal    PLAN:  -Audiogram reviewed with patient  -CT temporal bone reviewed with patient   -MRI IAC ww/o contrast normal, reviewed with patient. Shows right middle ear and mastoid opacification.  -CT temporal bone personally reviewed   -Consider right PE tube placement or eustachian tube balloon dilation. PE tube might not be possible due to TM scarring/blunting. Patient would like to observe for now.   -Follow up in 6 months  ________________________________________________________________________________  Interval history:  She reports that right ear is plugging up again and feels full.     HISTORY OF PRESENT ILLNESS: Radha Billingsley is a 59 year old female who presents for evaluation of worsening hearing on the right side, perforation of the right tympanic membrane with chronic myringitis. The patient has experienced problems with her ears, such as right-sided otalgia, progressive hearing loss, and otorrhea. She has been seeing Dr. Perera for routine ear cleanings and has previously received treatment with Ciprodex drops, TobraDex drops, and Cipro floxacillin tablets. She saw Dr. Perera on 7/23/2024 and a recent CT scan of her temporal bones demonstrates thickening of the right tympanic membrane and reduction in the number of mastoid air cells related to chronic mastoid effusions; she was started TobraDex otic drops as well as ciprofloxacin and referral here to discuss further management.         PAST MEDICAL HISTORY:    Past Medical History:    Calculus of kidney    Diverticulitis    medication    Esophageal reflux    Taking omeprazole    Essential hypertension    High blood pressure    Migraines    PONV (postoperative nausea and vomiting)    Recurrent acute otitis media       PAST SURGICAL HISTORY:    Past Surgical History:   Procedure Laterality Date    Breast biopsy      breast lump           x2    Colonoscopy      Colonoscopy N/A 2023    Procedure: COLONOSCOPY with biopsy and polypectomy;  Surgeon: Sidney Recio MD;  Location: [facility] ENDOSCOPY    Contracept iud  2009    Cyst aspiration left  2010    D & c      x2    Needle biopsy left      Other surgical history      ear surgery    Tonsillectomy         Current Outpatient Medications on File Prior to Visit   Medication Sig Dispense Refill    metoprolol succinate ER 50 MG Oral Tablet 24 Hr Take 1 tablet (50 mg total) by mouth daily. 90 tablet 3    Omeprazole 40 MG Oral Capsule Delayed Release Take 1 capsule (40 mg total) by mouth daily. 90 capsule 1    clobetasol 0.05 % External Ointment Use bid 60 g 1    zolpidem (AMBIEN) 5 MG Oral Tab Take 1 tablet (5 mg total) by mouth nightly as needed for Sleep. 30 tablet 5    tobramycin-dexamethasone 0.3-0.1 % Ophthalmic Suspension Place 3 drops in ear(s) every 8 (eight) hours. 10 mL 0    ciprofloxacin 500 MG Oral Tab Take 1 tablet (500 mg total) by mouth every 12 (twelve) hours. 14 tablet 0    ofloxacin 0.3 % Otic Solution Place 3 drops in ear(s) in the morning, at noon, and at bedtime. 10 mL 0     No current facility-administered medications on file prior to visit.       Allergies:   Allergies   Allergen Reactions    Amoxicillin-Pot Clavulanate OTHER (SEE COMMENTS)    Augmentin  [Amoclan] NAUSEA AND VOMITING    Sulfa Antibiotics NAUSEA AND VOMITING    Sulfamethoxazole NAUSEA AND VOMITING    Trimethoprim UNKNOWN    Trimethoprim UNKNOWN       SOCIAL HISTORY:    Social History      Tobacco Use    Smoking status: Former     Current packs/day: 0.00     Average packs/day: 0.5 packs/day for 4.0 years (2.0 ttl pk-yrs)     Types: Cigarettes     Start date: 1979     Quit date: 1983     Years since quittin.8    Smokeless tobacco: Never    Tobacco comments:     quit    Substance Use Topics    Alcohol use: Yes     Alcohol/week: 2.0 standard drinks of alcohol     Types: 2 Standard drinks or equivalent per week     Comment: 2 drinks weekly       FAMILY HISTORY: Denies known family history of hearing loss, tinnitus, vertigo, or migraine.  Denies known family history of head and neck cancer, thyroid cancer, bleeding disorders.     REVIEW OF SYSTEMS:   Positives are in bold  Neuro: Headache, facial weakness, facial numbness, neck pain, vertigo  ENT: Hearing change, tinnitus, otorrhea, otalgia, aural fullness, ear pressure, vertigo, imbalance  Sinus pressure, rhinorrhea, congestion, facial pain, jaw pain, dysphagia, odynophagia, sore throat, voice changes, shortness of breath    EXAMINATION:  I washed my hands with an alcohol-based hand gel prior to examination  Constitutional:   --Vitals: not currently breastfeeding.  --General: no apparent distress, well-developed, conversant  Psych: affect pleasant and appropriate for age, alert and oriented  Neuro: Facial movement normal bilateral  Eyes: Pupils equal, symmetric and reactive to light.  Extra-ocular muscles intact  Respiratory: No stridor, stertor or increased work of breathing  ENT:  --Ear: The bilateral ears were examined under binocular microscopy  Right ear microscopic exam:  Pinna: Normal, no lesions or masses.  Mastoid: Nontender on palpation.   External auditory canal: With fungal debris -  suctioned. no masses or lesions.  Tympanic membrane: Thickened, but there is an inferior thin area  Middle ear: serous otitis media.    Left ear microscopic exam:  Pinna: Normal, no lesions or masses.  Mastoid: Nontender on palpation.    External auditory canal: Clear, no masses or lesions.  Tympanic membrane: Thickened with medial scarring in inferior sulcus, no lesions, loss of landmarks.  Middle ear: Difficult to assess.    CT Temporal Bones 7/17/2024 (personally reviewed bilateral partial mastoid opacification and tympanic membrane thickening, minimal sinus disease)  Impression  CONCLUSION:  1. Severe opacification of the right middle ear cavity and right mastoid air cells with an intact right ossicular chain.  These findings are favored to reflect sequela of otomastoiditis.  2. Mild abnormal soft tissue involving the left middle ear cavity predominantly underlying the tympanic membrane and adjacent to the manubrium of the malleus with an intact left ossicular chain.  These findings are also favored to be sequela of  otomastoiditis.  3. Mild abnormal soft tissue thickening involving the bilateral external auditory canals, which has progressed when compared to 09/14/2021.  These findings may reflect acute or sequela of prior otitis externa.  4. Marked thickening and retraction of the bilateral tympanic membranes.  5. Thinning of the bony coverings of the bilateral superior semicircular canals.    6. Mild multifocal paranasal sinus mucosal thickening with scattered areas of aerosolized secretions, which can reflect acute sinusitis in the appropriate clinical setting.  7. Lesser incidental findings described above.    MRI IAC ww/o contrast 9/9/2024 (no retrocochlear masses, right mastoid and middle ear opacification)  Impression   CONCLUSION:  1. Negative MRI of the IAC's with gadolinium.  2. No cerebellar pontine angle mass.  3. Right mastoid effusions suggest mastoiditis..  4. Bilobed mucous retention cyst versus mucocele in the right sphenoid sinus measures 2.3 x 1.8 x 1.0 cm.        ASSESSMENT/PLAN:  Radha Billingsley is a 59 year old female with     ICD-10-CM   1. Other infective acute otitis externa of right ear  H60.391   2. Chronic  Eustachian tube dysfunction, bilateral  H69.93   3. Chronic reactive otitis externa of both ears  H60.8X3   4. Asymmetric SNHL (sensorineural hearing loss)  H90.3       IMPRESSION:  Right chronic otitis media - culture taken today   Bilateral chronic eustachian tube dysfunction   Bilateral medial canal stenosing fibrosis   Asymmetrical SNHL, bilateral mixed hearing loss - MRI IAC ww/o contrast normal    PLAN:  -Restart alcohol/vinegar ear flushes - patient educated on use 1:1 mixture of 70% or greater isopropyl alcohol and vinegar. Directions/instruction/schedule were given to the patient. The goal of this treatment is to help dry out the affected ear.   -Consider possibility that the otitis media is inflammatory rather than infectious   -Culture taken from the right ear, will call patient with results  -Consider right PE tube placement or eustachian tube balloon dilation. PE tube might not be possible due to TM scarring/blunting. Patient would like to observe for now.   -I will send mychart with culture results    Situation reviewed with the patient in detail.    Attention: This note has been scribed by Kelly Bryant under the supervision of Quintin Herron MD.     Quintin Herron MD  Otology/Otolaryngology  [address] [place] [address]  Phone [phone]  Fax [phone]      I have personally performed the services described in this documentation. All medical record entries made by the scribe were at my direction and in my presence. I have reviewed the chart and agree that the medical record reflects my personal performance and is accurate and complete.

## 2024-10-29 ENCOUNTER — TELEPHONE (OUTPATIENT)
Dept: OTOLARYNGOLOGY | Facility: CLINIC | Age: 59
End: 2024-10-29

## 2024-10-29 RX ORDER — ERYTHROMYCIN 500 MG/1
500 TABLET, DELAYED RELEASE ORAL 2 TIMES DAILY
Qty: 14 TABLET | Refills: 0 | Status: SHIPPED | OUTPATIENT
Start: 2024-10-29 | End: 2024-11-04

## 2024-10-29 NOTE — TELEPHONE ENCOUNTER
Mixed positive gram positive bacteria growing on ear culture. Spoke with patient on telephone, will start a course of erythromycin. Allergies reviewed.

## 2024-10-30 ENCOUNTER — TELEPHONE (OUTPATIENT)
Dept: OTOLARYNGOLOGY | Facility: CLINIC | Age: 59
End: 2024-10-30

## 2024-10-30 NOTE — TELEPHONE ENCOUNTER
Patient wants the nurse to know that she did call up [facility] in [place] on [address], and was informed that they do have Erythromycin in stock and it would be better to call in the prescription for quicker service. Pharmacy phone # [phone].

## 2024-10-30 NOTE — TELEPHONE ENCOUNTER
Patient calling stating medication that was prescribed is out of stock in all the [facility] and [facility] [place].Please advise

## 2024-10-30 NOTE — TELEPHONE ENCOUNTER
Patient will call [facility] and [facility] pharmacy to see if they have erythromycin prescription available.  Patient will call office back if unable to obtain prescription today at local pharmacies. (Okay To have call center to transfer call to nurse)    [facility] [place] on [address] and they do not have erythromycin 500 mg tablet EC.    Consulted with Dr. Sylvester and does not need to be enteric coated.  [facility] 500 [facility]., per pharmacistDorys they have 250mg EC capsules available and md notified.  New prescription per md  is erythromycin  mg capsules 3 times a day x 7 days, 21 capsules 0 refills.  Patient called and updated with above information

## 2024-11-04 ENCOUNTER — OFFICE VISIT (OUTPATIENT)
Dept: OTOLARYNGOLOGY | Facility: CLINIC | Age: 59
End: 2024-11-04

## 2024-11-04 DIAGNOSIS — H69.93 CHRONIC EUSTACHIAN TUBE DYSFUNCTION, BILATERAL: ICD-10-CM

## 2024-11-04 DIAGNOSIS — H65.92 LEFT SEROUS OTITIS MEDIA, UNSPECIFIED CHRONICITY: ICD-10-CM

## 2024-11-04 DIAGNOSIS — H66.91 RIGHT CHRONIC OTITIS MEDIA: Primary | ICD-10-CM

## 2024-11-04 DIAGNOSIS — H61.303 EXTERNAL AUDITORY CANAL STENOSIS, BILATERAL: ICD-10-CM

## 2024-11-04 DIAGNOSIS — H90.6 MIXED HEARING LOSS, BILATERAL: ICD-10-CM

## 2024-11-04 DIAGNOSIS — H90.3 SNHL (SENSORY-NEURAL HEARING LOSS), ASYMMETRICAL: ICD-10-CM

## 2024-11-04 DIAGNOSIS — H66.91 RIGHT ACUTE OTITIS MEDIA: ICD-10-CM

## 2024-11-04 PROCEDURE — 99214 OFFICE O/P EST MOD 30 MIN: CPT | Performed by: OTOLARYNGOLOGY

## 2024-11-04 RX ORDER — ERYTHROMYCIN BASE 250 MG
250 CAPSULE,DELAYED RELEASE (ENTERIC COATED) ORAL 3 TIMES DAILY
COMMUNITY
Start: 2024-10-30 | End: 2024-11-04

## 2024-11-04 RX ORDER — CIPROFLOXACIN AND DEXAMETHASONE 3; 1 MG/ML; MG/ML
4 SUSPENSION/ DROPS AURICULAR (OTIC) EVERY 12 HOURS
Qty: 7.5 ML | Refills: 0 | Status: SHIPPED | OUTPATIENT
Start: 2024-11-04 | End: 2024-11-11

## 2024-11-04 RX ORDER — ERYTHROMYCIN BASE 250 MG
250 CAPSULE,DELAYED RELEASE (ENTERIC COATED) ORAL 3 TIMES DAILY
Qty: 9 CAPSULE | Refills: 0 | Status: SHIPPED | OUTPATIENT
Start: 2024-11-04 | End: 2024-11-07

## 2024-11-04 NOTE — PROGRESS NOTES
PATIENT PROGRESS NOTE  OTOLOGY/OTOLARYNGOLOGY    REF MD:  No referring provider defined for this encounter.     PCP: Thanh Marshall DO    CHIEF COMPLAINT:    Chief Complaint   Patient presents with    Follow - Up     2 week reevaluation, reports no improvements with medication and white drainage out of ear      LAST VISIT - 10/29/2024    IMPRESSION:  Right chronic otitis media - culture taken today   Bilateral chronic eustachian tube dysfunction   Bilateral medial canal stenosing fibrosis   Asymmetrical SNHL, bilateral mixed hearing loss - MRI IAC ww/o contrast normal    PLAN:  -Restart alcohol/vinegar ear flushes - patient educated on use 1:1 mixture of 70% or greater isopropyl alcohol and vinegar. Directions/instruction/schedule were given to the patient. The goal of this treatment is to help dry out the affected ear.   -Consider possibility that the otitis media is inflammatory rather than infectious   -Culture taken from the right ear, will call patient with results  -Consider right PE tube placement or eustachian tube balloon dilation. PE tube might not be possible due to TM scarring/blunting. Patient would like to observe for now.   -I will send mychart with culture results  ________________________________________________________________________________  Interval history:  Patient was started on azithromycin. Reports plugging in ears. She states that she has a cold now.     HISTORY OF PRESENT ILLNESS: Radha Billingsley is a 59 year old female who presents for evaluation of worsening hearing on the right side, perforation of the right tympanic membrane with chronic myringitis. The patient has experienced problems with her ears, such as right-sided otalgia, progressive hearing loss, and otorrhea. She has been seeing Dr. Perera for routine ear cleanings and has previously received treatment with Ciprodex drops, TobraDex drops, and Cipro floxacillin tablets. She saw Dr. Perera on 7/23/2024 and a recent CT scan of  her temporal bones demonstrates thickening of the right tympanic membrane and reduction in the number of mastoid air cells related to chronic mastoid effusions; she was started TobraDex otic drops as well as ciprofloxacin and referral here to discuss further management.        PAST MEDICAL HISTORY:    Past Medical History:    Calculus of kidney    Diverticulitis    medication    Esophageal reflux    Taking omeprazole    Essential hypertension    High blood pressure    Migraines    PONV (postoperative nausea and vomiting)    Recurrent acute otitis media       PAST SURGICAL HISTORY:    Past Surgical History:   Procedure Laterality Date    Breast biopsy      breast lump           x2    Colonoscopy      Colonoscopy N/A 2023    Procedure: COLONOSCOPY with biopsy and polypectomy;  Surgeon: Sidney Recio MD;  Location: [facility] ENDOSCOPY    [facility] iud      Cyst aspiration left  2010    D & c      x2    Needle biopsy left      Other surgical history      ear surgery    Tonsillectomy         Current Outpatient Medications on File Prior to Visit   Medication Sig Dispense Refill    metoprolol succinate ER 50 MG Oral Tablet 24 Hr Take 1 tablet (50 mg total) by mouth daily. 90 tablet 3    Omeprazole 40 MG Oral Capsule Delayed Release Take 1 capsule (40 mg total) by mouth daily. 90 capsule 1    clobetasol 0.05 % External Ointment Use bid 60 g 1    zolpidem (AMBIEN) 5 MG Oral Tab Take 1 tablet (5 mg total) by mouth nightly as needed for Sleep. 30 tablet 5     No current facility-administered medications on file prior to visit.       Allergies:   Allergies   Allergen Reactions    Amoxicillin-Pot Clavulanate OTHER (SEE COMMENTS)    Augmentin  [Amoclan] NAUSEA AND VOMITING    Sulfa Antibiotics NAUSEA AND VOMITING    Sulfamethoxazole NAUSEA AND VOMITING    Trimethoprim UNKNOWN    Trimethoprim UNKNOWN       SOCIAL HISTORY:    Social History     Tobacco Use    Smoking status: Former     Current  packs/day: 0.00     Average packs/day: 0.5 packs/day for 4.0 years (2.0 ttl pk-yrs)     Types: Cigarettes     Start date: 1979     Quit date: 1983     Years since quittin.8    Smokeless tobacco: Never    Tobacco comments:     quit    Substance Use Topics    Alcohol use: Yes     Alcohol/week: 2.0 standard drinks of alcohol     Types: 2 Standard drinks or equivalent per week     Comment: 2 drinks weekly       FAMILY HISTORY: Denies known family history of hearing loss, tinnitus, vertigo, or migraine.  Denies known family history of head and neck cancer, thyroid cancer, bleeding disorders.     REVIEW OF SYSTEMS:   Positives are in bold  Neuro: Headache, facial weakness, facial numbness, neck pain, vertigo  ENT: Hearing change, tinnitus, otorrhea, otalgia, aural fullness, ear pressure, vertigo, imbalance  Sinus pressure, rhinorrhea, congestion, facial pain, jaw pain, dysphagia, odynophagia, sore throat, voice changes, shortness of breath    EXAMINATION:  I washed my hands with an alcohol-based hand gel prior to examination  Constitutional:   --Vitals: not currently breastfeeding.  --General: no apparent distress, well-developed, conversant  Psych: affect pleasant and appropriate for age, alert and oriented  Neuro: Facial movement normal bilateral  Eyes: Pupils equal, symmetric and reactive to light.  Extra-ocular muscles intact  Respiratory: No stridor, stertor or increased work of breathing  ENT:  --Ear: The bilateral ears were examined under binocular microscopy  Right ear microscopic exam:  Pinna: Normal, no lesions or masses.  Mastoid: Nontender on palpation.   External auditory canal: purulence suctioned and canal is erythematous.   Tympanic membrane: Thickened, but there is an inferior thin area  Middle ear:  acute otitis media     Left ear microscopic exam:  Pinna: Normal, no lesions or masses.  Mastoid: Nontender on palpation.   External auditory canal: Clear, no masses or lesions.  Tympanic  membrane: Thickened with medial scarring in inferior sulcus, no lesions, loss of landmarks.  Middle ear: serous otitis media     CT Temporal Bones 7/17/2024 (personally reviewed bilateral partial mastoid opacification and tympanic membrane thickening, minimal sinus disease)  Impression  CONCLUSION:  1. Severe opacification of the right middle ear cavity and right mastoid air cells with an intact right ossicular chain.  These findings are favored to reflect sequela of otomastoiditis.  2. Mild abnormal soft tissue involving the left middle ear cavity predominantly underlying the tympanic membrane and adjacent to the manubrium of the malleus with an intact left ossicular chain.  These findings are also favored to be sequela of  otomastoiditis.  3. Mild abnormal soft tissue thickening involving the bilateral external auditory canals, which has progressed when compared to 09/14/2021.  These findings may reflect acute or sequela of prior otitis externa.  4. Marked thickening and retraction of the bilateral tympanic membranes.  5. Thinning of the bony coverings of the bilateral superior semicircular canals.    6. Mild multifocal paranasal sinus mucosal thickening with scattered areas of aerosolized secretions, which can reflect acute sinusitis in the appropriate clinical setting.  7. Lesser incidental findings described above.    MRI IAC ww/o contrast 9/9/2024 (no retrocochlear masses, right mastoid and middle ear opacification)  Impression   CONCLUSION:  1. Negative MRI of the IAC's with gadolinium.  2. No cerebellar pontine angle mass.  3. Right mastoid effusions suggest mastoiditis..  4. Bilobed mucous retention cyst versus mucocele in the right sphenoid sinus measures 2.3 x 1.8 x 1.0 cm.        Anaerobic and Aerobic cultures of Right Ear- 10/21/2024  AEROBIC CULTURE RESULT No Staph aureus, Staph lugdunensis, Beta Strep, or Pseudomonas aeruginosa isolated   3+ growth Mixed gram positive lorraine            AEROBIC SMEAR No  WBCs seen   2+ Gram Positive Cocci           Anaerobic Culture No Anaerobes isolated          ASSESSMENT/PLAN:  Radha Billingsley is a 59 year old female with     ICD-10-CM   1. Right chronic otitis media  H66.91   2. Chronic Eustachian tube dysfunction, bilateral  H69.93   3. External auditory canal stenosis, bilateral  H61.303   4. SNHL (sensory-neural hearing loss), asymmetrical  H90.3   5. Mixed hearing loss, bilateral  H90.6   6. Left serous otitis media, unspecified chronicity  H65.92   7. Right acute otitis media  H66.91         IMPRESSION:  Right chronic otitis media   Bilateral chronic eustachian tube dysfunction   Bilateral medial canal stenosing fibrosis   Asymmetrical SNHL, bilateral mixed hearing loss - MRI IAC ww/o contrast normal  Left serous otitis media   Right acute otitis media     PLAN:  -Reviewed culture results with patient   -Stop alcohol/vinegar ear flushes - patient educated on use 1:1 mixture of 70% or greater isopropyl alcohol and vinegar. Directions/instruction/schedule were given to the patient. The goal of this treatment is to help dry out the affected ear  -Consider right bilateral eustachian tube balloon dilation in the future  -Continue Azithromycin. Will extend an additional 3 days, and for a total of 10 days.  -Start Ciprodex otic drops, placing 4 drops into the right ear twice daily (BID) for 1 week.  -Follow up in 2 weeks for reevaluation.    Situation reviewed with the patient in detail.    Attention: This note has been scribed by Kelly Bryant under the supervision of Quintin Herron MD.     Quintin Herron MD  Otology/Otolaryngology  [address] [place] [address]  Phone [phone]  Fax [phone]      I have personally performed the services described in this documentation. All medical record entries made by the scribe were at my direction and in my presence. I have reviewed the chart and agree that the  medical record reflects my personal performance and is accurate and complete.

## 2024-11-06 ENCOUNTER — TELEPHONE (OUTPATIENT)
Dept: OTOLARYNGOLOGY | Facility: CLINIC | Age: 59
End: 2024-11-06

## 2024-11-06 NOTE — TELEPHONE ENCOUNTER
Per patient Marcelo is out of erythromycin stating she was supposed to take it longer due to the dosage change. Please advise

## 2024-11-06 NOTE — TELEPHONE ENCOUNTER
Per patient, unable to fill prescription at [facility] in [place], as they are out of erythromycin  mg capsules, as are all surrounding [facility] pharmacies.  Contacted Denise pharmacist at [facility] at [address] [place] [address]. PH--[phone], and they have medication, prescription called into [facility] pharmacist, Denise, will fill today. Patient contacted and will  today.  Spoke with Traci [facility] pharmacist, that prescription was called into [facility] in [place].

## 2024-11-15 ENCOUNTER — OFFICE VISIT (OUTPATIENT)
Dept: OTOLARYNGOLOGY | Facility: CLINIC | Age: 59
End: 2024-11-15
Payer: COMMERCIAL

## 2024-11-15 DIAGNOSIS — H69.93 CHRONIC EUSTACHIAN TUBE DYSFUNCTION, BILATERAL: Primary | ICD-10-CM

## 2024-11-15 DIAGNOSIS — H90.6 MIXED HEARING LOSS, BILATERAL: ICD-10-CM

## 2024-11-15 DIAGNOSIS — H61.303 EXTERNAL AUDITORY CANAL STENOSIS, BILATERAL: ICD-10-CM

## 2024-11-15 PROCEDURE — 92504 EAR MICROSCOPY EXAMINATION: CPT | Performed by: OTOLARYNGOLOGY

## 2024-11-15 PROCEDURE — 99213 OFFICE O/P EST LOW 20 MIN: CPT | Performed by: OTOLARYNGOLOGY

## 2024-11-15 NOTE — PROGRESS NOTES
PATIENT PROGRESS NOTE  OTOLOGY/OTOLARYNGOLOGY    REF MD:  No referring provider defined for this encounter.     PCP: Thanh Marshall DO    CHIEF COMPLAINT:    Chief Complaint   Patient presents with    Follow - Up     Reevaluation on right ear , pt reports improvements      LAST VISIT - 11/04/2024    IMPRESSION:  Right chronic otitis media   Bilateral chronic eustachian tube dysfunction   Bilateral medial canal stenosing fibrosis   Asymmetrical SNHL, bilateral mixed hearing loss - MRI IAC ww/o contrast normal  Left serous otitis media   Right acute otitis media     PLAN:  -Reviewed culture results with patient   -Stop alcohol/vinegar ear flushes - patient educated on use 1:1 mixture of 70% or greater isopropyl alcohol and vinegar. Directions/instruction/schedule were given to the patient. The goal of this treatment is to help dry out the affected ear  -Consider right bilateral eustachian tube balloon dilation in the future  -Continue Azithromycin. Will extend an additional 3 days, and for a total of 10 days.  -Start Ciprodex otic drops, placing 4 drops into the right ear twice daily (BID) for 1 week.  -Follow up in 2 weeks for reevaluation.  ________________________________________________________________________________  Interval history: The patient reports feeling significantly better compared to the last visit. She states that her overall hearing has remained stable and is not terrible. Examination confirms that her ear infection has resolved. Additionally, the patient mentions engaging in lifting activities due to starting a new job. She has finished her antibiotics and is feeling better.      HISTORY OF PRESENT ILLNESS: Radha Billingsley is a 59 year old female who presents for evaluation of worsening hearing on the right side, perforation of the right tympanic membrane with chronic myringitis. The patient has experienced problems with her ears, such as right-sided otalgia, progressive hearing loss, and  otorrhea. She has been seeing Dr. Perera for routine ear cleanings and has previously received treatment with Ciprodex drops, TobraDex drops, and Cipro floxacillin tablets. She saw Dr. Perera on 2024 and a recent CT scan of her temporal bones demonstrates thickening of the right tympanic membrane and reduction in the number of mastoid air cells related to chronic mastoid effusions; she was started TobraDex otic drops as well as ciprofloxacin and referral here to discuss further management.        PAST MEDICAL HISTORY:    Past Medical History:    Calculus of kidney    Diverticulitis    medication    Esophageal reflux    Taking omeprazole    Essential hypertension    High blood pressure    Migraines    PONV (postoperative nausea and vomiting)    Recurrent acute otitis media       PAST SURGICAL HISTORY:    Past Surgical History:   Procedure Laterality Date    Breast biopsy      breast lump           x2    Colonoscopy      Colonoscopy N/A 2023    Procedure: COLONOSCOPY with biopsy and polypectomy;  Surgeon: Sidney Recio MD;  Location: [facility] ENDOSCOPY    Contracept iud      Cyst aspiration left  2010    D & c      x2    Needle biopsy left      Other surgical history      ear surgery    Tonsillectomy         Current Outpatient Medications on File Prior to Visit   Medication Sig Dispense Refill    metoprolol succinate ER 50 MG Oral Tablet 24 Hr Take 1 tablet (50 mg total) by mouth daily. 90 tablet 3    Omeprazole 40 MG Oral Capsule Delayed Release Take 1 capsule (40 mg total) by mouth daily. 90 capsule 1    clobetasol 0.05 % External Ointment Use bid 60 g 1    zolpidem (AMBIEN) 5 MG Oral Tab Take 1 tablet (5 mg total) by mouth nightly as needed for Sleep. 30 tablet 5     No current facility-administered medications on file prior to visit.       Allergies:   Allergies   Allergen Reactions    Amoxicillin-Pot Clavulanate OTHER (SEE COMMENTS)    Augmentin  [Amoclan] NAUSEA AND VOMITING     Sulfa Antibiotics NAUSEA AND VOMITING    Sulfamethoxazole NAUSEA AND VOMITING    Trimethoprim UNKNOWN    Trimethoprim UNKNOWN       SOCIAL HISTORY:    Social History     Tobacco Use    Smoking status: Former     Current packs/day: 0.00     Average packs/day: 0.5 packs/day for 4.0 years (2.0 ttl pk-yrs)     Types: Cigarettes     Start date: 1979     Quit date: 1983     Years since quittin.9    Smokeless tobacco: Never    Tobacco comments:     quit    Substance Use Topics    Alcohol use: Yes     Alcohol/week: 2.0 standard drinks of alcohol     Types: 2 Standard drinks or equivalent per week     Comment: 2 drinks weekly       FAMILY HISTORY: Denies known family history of hearing loss, tinnitus, vertigo, or migraine.  Denies known family history of head and neck cancer, thyroid cancer, bleeding disorders.     REVIEW OF SYSTEMS:   Positives are in bold  Neuro: Headache, facial weakness, facial numbness, neck pain, vertigo  ENT: Hearing change, tinnitus, otorrhea, otalgia, aural fullness, ear pressure, vertigo, imbalance  Sinus pressure, rhinorrhea, congestion, facial pain, jaw pain, dysphagia, odynophagia, sore throat, voice changes, shortness of breath    EXAMINATION:  I washed my hands with an alcohol-based hand gel prior to examination  Constitutional:   --Vitals: not currently breastfeeding.  --General: no apparent distress, well-developed, conversant  Psych: affect pleasant and appropriate for age, alert and oriented  Neuro: Facial movement normal bilateral  Respiratory: No stridor, stertor or increased work of breathing  ENT:  --Ear: The bilateral ears were examined under binocular microscopy  Right ear microscopic exam:  Pinna: Normal, no lesions or masses.  Mastoid: Nontender on palpation.   External auditory canal: purulence suctioned and canal is erythematous.   Tympanic membrane: Thickened, but there is an inferior thin area  Middle ear:  sesrous otitis media     Left ear microscopic  exam:  Pinna: Normal, no lesions or masses.  Mastoid: Nontender on palpation.   External auditory canal: Clear, no masses or lesions.  Tympanic membrane: Thickened with medial scarring in inferior sulcus, no lesions, loss of landmarks.  Middle ear: serous otitis media     CT Temporal Bones 7/17/2024 (personally reviewed bilateral partial mastoid opacification and tympanic membrane thickening, minimal sinus disease)  Impression  CONCLUSION:  1. Severe opacification of the right middle ear cavity and right mastoid air cells with an intact right ossicular chain.  These findings are favored to reflect sequela of otomastoiditis.  2. Mild abnormal soft tissue involving the left middle ear cavity predominantly underlying the tympanic membrane and adjacent to the manubrium of the malleus with an intact left ossicular chain.  These findings are also favored to be sequela of  otomastoiditis.  3. Mild abnormal soft tissue thickening involving the bilateral external auditory canals, which has progressed when compared to 09/14/2021.  These findings may reflect acute or sequela of prior otitis externa.  4. Marked thickening and retraction of the bilateral tympanic membranes.  5. Thinning of the bony coverings of the bilateral superior semicircular canals.    6. Mild multifocal paranasal sinus mucosal thickening with scattered areas of aerosolized secretions, which can reflect acute sinusitis in the appropriate clinical setting.  7. Lesser incidental findings described above.    MRI IAC ww/o contrast 9/9/2024 (no retrocochlear masses, right mastoid and middle ear opacification)  Impression   CONCLUSION:  1. Negative MRI of the IAC's with gadolinium.  2. No cerebellar pontine angle mass.  3. Right mastoid effusions suggest mastoiditis..  4. Bilobed mucous retention cyst versus mucocele in the right sphenoid sinus measures 2.3 x 1.8 x 1.0 cm.        Anaerobic and Aerobic cultures of Right Ear- 10/21/2024  AEROBIC CULTURE RESULT No  Staph aureus, Staph lugdunensis, Beta Strep, or Pseudomonas aeruginosa isolated   3+ growth Mixed gram positive lorraine            AEROBIC SMEAR No WBCs seen   2+ Gram Positive Cocci           Anaerobic Culture No Anaerobes isolated          ASSESSMENT/PLAN:  Radha Billingsley is a 59 year old female with     ICD-10-CM   1. Chronic Eustachian tube dysfunction, bilateral  H69.93   2. External auditory canal stenosis, bilateral  H61.303   3. Mixed hearing loss, bilateral  H90.6           IMPRESSION:  Bilateral chronic serous otitis media  Bilateral eustachian tube dysfunction  Scarring of the bilateral tympanic membrane    PLAN:  - Consider bilateral Eustachian tube balloon dilation in the future.  - It may be difficult to place tympanostomy tubes due to thickening of the eardrums related to chronic infections.  - The patient will follow up here at convenience to consider ETBD; alternatively, Dr. Sylvester can attempt to place tympanostomy tubes in the office but discussed with the patient that it may not be possible.    Situation reviewed with the patient in detail.    Attention: This note has been scribed by Moni Gavin under the supervision of Quintin Herron MD.     Quintin Herron MD  Otology/Otolaryngology  [address] [place] [address]  Phone [phone]  Fax [phone]      I have personally performed the services described in this documentation. All medical record entries made by the scribe were at my direction and in my presence. I have reviewed the chart and agree that the medical record reflects my personal performance and is accurate and complete.

## 2024-11-26 RX ORDER — OMEPRAZOLE 40 MG/1
40 CAPSULE, DELAYED RELEASE ORAL DAILY
Qty: 90 CAPSULE | Refills: 1 | Status: SHIPPED | OUTPATIENT
Start: 2024-11-26 | End: 2025-02-03

## 2024-11-26 NOTE — TELEPHONE ENCOUNTER
Requested Prescriptions     Pending Prescriptions Disp Refills    OMEPRAZOLE 40 MG Oral Capsule Delayed Release [Pharmacy Med Name: OMEPRAZOLE 40MG CAPSULES] 90 capsule 1     Sig: TAKE 1 CAPSULE(40 MG) BY MOUTH DAILY     LOV: Procedure: 6/22/2023  Last Refill: 05/28/2024

## 2024-11-26 NOTE — TELEPHONE ENCOUNTER
GI RNs: Please contact patient.  I refilled the prescription for 6 months.  She should be seen in the office in follow-up for further refills.

## 2024-11-26 NOTE — TELEPHONE ENCOUNTER
I called and left a voicemail message for patient to call back for a F/U appt per MD note below.

## 2024-12-14 ENCOUNTER — LAB ENCOUNTER (OUTPATIENT)
Dept: LAB | Facility: HOSPITAL | Age: 59
End: 2024-12-14
Attending: FAMILY MEDICINE
Payer: COMMERCIAL

## 2024-12-14 DIAGNOSIS — Z00.00 ROUTINE GENERAL MEDICAL EXAMINATION AT A HEALTH CARE FACILITY: ICD-10-CM

## 2024-12-14 LAB
ALBUMIN SERPL-MCNC: 4.4 G/DL (ref 3.2–4.8)
ALBUMIN/GLOB SERPL: 2 {RATIO} (ref 1–2)
ALP LIVER SERPL-CCNC: 92 U/L
ALT SERPL-CCNC: 29 U/L
ANION GAP SERPL CALC-SCNC: 7 MMOL/L (ref 0–18)
AST SERPL-CCNC: 23 U/L (ref ?–34)
BASOPHILS # BLD AUTO: 0.04 X10(3) UL (ref 0–0.2)
BASOPHILS NFR BLD AUTO: 0.8 %
BILIRUB SERPL-MCNC: 0.5 MG/DL (ref 0.2–1.1)
BUN BLD-MCNC: 19 MG/DL (ref 9–23)
BUN/CREAT SERPL: 23.8 (ref 10–20)
CALCIUM BLD-MCNC: 9.9 MG/DL (ref 8.7–10.4)
CHLORIDE SERPL-SCNC: 110 MMOL/L (ref 98–112)
CHOLEST SERPL-MCNC: 196 MG/DL (ref ?–200)
CO2 SERPL-SCNC: 26 MMOL/L (ref 21–32)
CREAT BLD-MCNC: 0.8 MG/DL
DEPRECATED RDW RBC AUTO: 41 FL (ref 35.1–46.3)
EGFRCR SERPLBLD CKD-EPI 2021: 84 ML/MIN/1.73M2 (ref 60–?)
EOSINOPHIL # BLD AUTO: 0.24 X10(3) UL (ref 0–0.7)
EOSINOPHIL NFR BLD AUTO: 4.9 %
ERYTHROCYTE [DISTWIDTH] IN BLOOD BY AUTOMATED COUNT: 13.6 % (ref 11–15)
FASTING PATIENT LIPID ANSWER: YES
FASTING STATUS PATIENT QL REPORTED: YES
GLOBULIN PLAS-MCNC: 2.2 G/DL (ref 2–3.5)
GLUCOSE BLD-MCNC: 96 MG/DL (ref 70–99)
HCT VFR BLD AUTO: 40.3 %
HDLC SERPL-MCNC: 55 MG/DL (ref 40–59)
HGB BLD-MCNC: 13.4 G/DL
IMM GRANULOCYTES # BLD AUTO: 0.01 X10(3) UL (ref 0–1)
IMM GRANULOCYTES NFR BLD: 0.2 %
LDLC SERPL CALC-MCNC: 129 MG/DL (ref ?–100)
LYMPHOCYTES # BLD AUTO: 1.65 X10(3) UL (ref 1–4)
LYMPHOCYTES NFR BLD AUTO: 34 %
MCH RBC QN AUTO: 27.5 PG (ref 26–34)
MCHC RBC AUTO-ENTMCNC: 33.3 G/DL (ref 31–37)
MCV RBC AUTO: 82.8 FL
MONOCYTES # BLD AUTO: 0.47 X10(3) UL (ref 0.1–1)
MONOCYTES NFR BLD AUTO: 9.7 %
NEUTROPHILS # BLD AUTO: 2.45 X10 (3) UL (ref 1.5–7.7)
NEUTROPHILS # BLD AUTO: 2.45 X10(3) UL (ref 1.5–7.7)
NEUTROPHILS NFR BLD AUTO: 50.4 %
NONHDLC SERPL-MCNC: 141 MG/DL (ref ?–130)
OSMOLALITY SERPL CALC.SUM OF ELEC: 298 MOSM/KG (ref 275–295)
PLATELET # BLD AUTO: 297 10(3)UL (ref 150–450)
POTASSIUM SERPL-SCNC: 4.3 MMOL/L (ref 3.5–5.1)
PROT SERPL-MCNC: 6.6 G/DL (ref 5.7–8.2)
RBC # BLD AUTO: 4.87 X10(6)UL
SODIUM SERPL-SCNC: 143 MMOL/L (ref 136–145)
TRIGL SERPL-MCNC: 63 MG/DL (ref 30–149)
VIT D+METAB SERPL-MCNC: 42.6 NG/ML (ref 30–100)
VLDLC SERPL CALC-MCNC: 11 MG/DL (ref 0–30)
WBC # BLD AUTO: 4.9 X10(3) UL (ref 4–11)

## 2024-12-14 PROCEDURE — 85025 COMPLETE CBC W/AUTO DIFF WBC: CPT

## 2024-12-14 PROCEDURE — 82306 VITAMIN D 25 HYDROXY: CPT

## 2024-12-14 PROCEDURE — 36415 COLL VENOUS BLD VENIPUNCTURE: CPT

## 2024-12-14 PROCEDURE — 80053 COMPREHEN METABOLIC PANEL: CPT

## 2024-12-14 PROCEDURE — 80061 LIPID PANEL: CPT

## 2025-01-23 ENCOUNTER — OFFICE VISIT (OUTPATIENT)
Dept: OBGYN CLINIC | Facility: CLINIC | Age: 60
End: 2025-01-23
Payer: COMMERCIAL

## 2025-01-23 VITALS
HEART RATE: 74 BPM | DIASTOLIC BLOOD PRESSURE: 82 MMHG | BODY MASS INDEX: 28 KG/M2 | SYSTOLIC BLOOD PRESSURE: 124 MMHG | WEIGHT: 175.81 LBS

## 2025-01-23 DIAGNOSIS — Z12.31 SCREENING MAMMOGRAM FOR BREAST CANCER: ICD-10-CM

## 2025-01-23 DIAGNOSIS — Z12.4 SCREENING FOR CERVICAL CANCER: ICD-10-CM

## 2025-01-23 DIAGNOSIS — Z01.419 WELL WOMAN EXAM WITH ROUTINE GYNECOLOGICAL EXAM: Primary | ICD-10-CM

## 2025-01-23 PROCEDURE — 3074F SYST BP LT 130 MM HG: CPT | Performed by: NURSE PRACTITIONER

## 2025-01-23 PROCEDURE — 99396 PREV VISIT EST AGE 40-64: CPT | Performed by: NURSE PRACTITIONER

## 2025-01-23 PROCEDURE — 3079F DIAST BP 80-89 MM HG: CPT | Performed by: NURSE PRACTITIONER

## 2025-01-23 NOTE — PROGRESS NOTES
[facility]    Obstetrics and Gynecology    Chief Complaint   Patient presents with    Gyn Exam     ANNUAL EXAM Reviewed Preventative/Wellness form with patient.        Radha Billingsley is a 59 year old female  No LMP recorded. (Menstrual status: Menopause). presenting for annual gynecology exam.  LMP in , no bleeding. No pelvic pain or abnormal discharge, no urinary or bowel concerns.  Rare ambien use.    No concerns with intercourse with her .  No hx of HRT    Works as a seamstress - active job.     Pap:2021 NILM, neg HPV  No history abnormal paps                Contraception:n/a  Mammo: 2024 normal; hx of breast fibroadenomas  Colonoscopy: 2023 and endoscopy and she has stricture and has stretchinga dn taking omeprazole.  Dexa: n/a      OBSTETRICS HISTORY:  OB History    Para Term  AB Living   5 2 2 0 3 2   SAB IAB Ectopic Multiple Live Births   0 0 1 0 0       GYNE HISTORY:  Menarche: 11 (2025 12:57 PM)  Period Cycle (Days): MENOPAUSAL (2025 12:57 PM)  Use of Birth Control (if yes, specify type): Postmenopausal (MENOPAUSE) (2025 12:57 PM)  Pap Result Notes: NO MORE THAN 2 YRS AGO NORMAL PER PATIENT (2025 12:57 PM)  Follow Up Recommendation: 2--24 MAMMO FLORIN C-2 BENGIN/// DEXA 8-5-24 OSTEOPENIA (2025 12:57 PM)      History   Sexual Activity    Sexual activity: Yes            No data to display                  MEDICAL HISTORY:  Past Medical History:    Calculus of kidney    Diverticulitis    medication    Esophageal reflux    Taking omeprazole    Essential hypertension    High blood pressure    Migraines    PONV (postoperative nausea and vomiting)    Recurrent acute otitis media     Past Surgical History:   Procedure Laterality Date    Breast biopsy      breast lump           x2    Colonoscopy      Colonoscopy N/A 2023    Procedure: COLONOSCOPY with biopsy and polypectomy;  Surgeon: Sidney Recio MD;  Location: [facility]  ENDOSCOPY    Contracept iud  2009    Cyst aspiration left  2010    D & c      x2    Needle biopsy left      Other surgical history      ear surgery    Tonsillectomy         SOCIAL HISTORY:  Social History     Socioeconomic History    Marital status:      Spouse name: Not on file    Number of children: Not on file    Years of education: Not on file    Highest education level: Not on file   Occupational History    Not on file   Tobacco Use    Smoking status: Former     Current packs/day: 0.00     Average packs/day: 0.5 packs/day for 4.0 years (2.0 ttl pk-yrs)     Types: Cigarettes     Start date: 1979     Quit date: 1983     Years since quittin.0    Smokeless tobacco: Never    Tobacco comments:     quit    Vaping Use    Vaping status: Never Used   Substance and Sexual Activity    Alcohol use: Yes     Alcohol/week: 2.0 standard drinks of alcohol     Types: 2 Standard drinks or equivalent per week     Comment: 2 drinks weekly    Drug use: No    Sexual activity: Yes   Other Topics Concern     Service Not Asked    Blood Transfusions Not Asked    Caffeine Concern Yes     Comment: 2 cups of coffee     Occupational Exposure Not Asked    Hobby Hazards Not Asked    Sleep Concern Not Asked    Stress Concern Not Asked    Weight Concern Not Asked    Special Diet Not Asked    Back Care Not Asked    Exercise Not Asked    Bike Helmet Not Asked    Seat Belt Not Asked    Self-Exams Not Asked    Grew up on a farm No    History of tanning No    Outdoor occupation No    Breast feeding No    Reaction to local anesthetic No    Pt has a pacemaker No    Pt has a defibrillator No   Social History Narrative    Not on file     Social Drivers of Health     Financial Resource Strain: Not on file   Food Insecurity: Not on file   Transportation Needs: Not on file   Physical Activity: Not on file   Stress: Not on file   Social Connections: Not on file   Housing Stability: Not on file         Depression Screening  (PHQ-2/PHQ-9): Over the LAST 2 WEEKS   Little interest or pleasure in doing things (over the last two weeks)?: Not at all    Feeling down, depressed, or hopeless (over the last two weeks)?: Not at all    PHQ-2 SCORE: 0           FAMILY HISTORY:  Family History   Problem Relation Age of Onset    Cancer Father         Esophageal    Hypertension Mother     Cancer Maternal Grandfather         Colon cancer       MEDICATIONS:    Current Outpatient Medications:     Calcium-Cholecalciferol (QC CALCIUM 500MG-D3) 500-5 MG-MCG Oral Tab, , Disp: , Rfl:     OMEPRAZOLE 40 MG Oral Capsule Delayed Release, TAKE 1 CAPSULE(40 MG) BY MOUTH DAILY, Disp: 90 capsule, Rfl: 1    metoprolol succinate ER 50 MG Oral Tablet 24 Hr, Take 1 tablet (50 mg total) by mouth daily., Disp: 90 tablet, Rfl: 3    clobetasol 0.05 % External Ointment, Use bid, Disp: 60 g, Rfl: 1    zolpidem (AMBIEN) 5 MG Oral Tab, Take 1 tablet (5 mg total) by mouth nightly as needed for Sleep., Disp: 30 tablet, Rfl: 5    ALLERGIES:  Allergies[1]      Review of Systems:  Constitutional:  Denies fatigue, night sweats, hot flashes  Eyes:  denies blurred or double vision  Cardiovascular:  denies chest pain or palpitations  Respiratory:  denies shortness of breath  Gastrointestinal:  denies heartburn, abdominal pain, diarrhea or constipation  Genitourinary:  denies dysuria, incontinence, abnormal vaginal discharge, vaginal itching,   Musculoskeletal:  denies back pain   Skin/Breast:  Denies any breast pain, lumps, or discharge.   Neurological:  denies headaches, extremity weakness or numbness.  Psychiatric: denies depression or anxiety.  Endocrine:   denies excessive thirst or urination.  Heme/Lymph:  denies history of anemia, easy bruising or bleeding.      PHYSICAL EXAM:     Vitals:    01/23/25 1301   BP: 124/82   Pulse: 74   Weight: 175 lb 12.8 oz (79.7 kg)       Body mass index is 27.53 kg/m².     Patient offered chaperone, patient declined    Constitutional: well  developed, well nourished  Psychiatric:  Oriented to time, place, person and situation. Appropriate mood and affect  Head/Face: normocephalic  Neck/Thyroid: thyroid symmetric, no thyromegaly, no nodules, no adenopathy  Lymphatic:no abnormal supraclavicular or axillary adenopathy is noted  Breast: normal without palpable masses, tenderness, asymmetry, nipple discharge, nipple retraction or skin changes  Abdomen:  soft, nontender, nondistended, no masses  Skin/Hair: no unusual rashes or bruises  Extremities: no edema, no cyanosis    Pelvic Exam:  External Genitalia: normal appearance, hair distribution, and no lesions  Urethral Meatus:  normal in size, location, without lesions and prolapse  Bladder:  No fullness, masses or tenderness  Vagina:  Normal appearance without lesions, no abnormal discharge  Cervix:  Normal without tenderness on motion  Uterus: normal in size, contour, position, mobility, without tenderness  Adnexa: normal without masses or tenderness  Perineum: normal  Anus: no hemorroids     Assessment & Plan:    ICD-10-CM    1. Well woman exam with routine gynecological exam  Z01.419       2. Screening for cervical cancer  Z12.4 ThinPrep PAP Smear     Hpv Dna  High Risk , Thin Prep Collect      3. Screening mammogram for breast cancer  Z12.31 [facility]GRITA 2D+3D SCREENING BILAT (CPT=77067/32874)         Reviewed ASCCP guidelines with the patient   Pap done today  Postmenopausal  reviewed to call if any bleeding.  Breast Health:     Reviewed current guidelines with the patient and to start Mammograms at age 40 - ordered  Reviewed monthly self breast exams with the patient   Discussed diet, exercise/strength training, MVIs with Ca/Vit D  Follow up in 1 yr for WWE    PIETER Corcoran    This note was prepared using Dragon Medical voice recognition dictation software. As a result errors may occur. When identified these errors have been corrected. While every attempt is made to correct errors during dictation  discrepancies may still exist.         [1]   Allergies  Allergen Reactions    Amoxicillin-Pot Clavulanate OTHER (SEE COMMENTS)    Augmentin  [Amoclan] NAUSEA AND VOMITING    Sulfa Antibiotics NAUSEA AND VOMITING    Sulfamethoxazole NAUSEA AND VOMITING    Trimethoprim UNKNOWN    Trimethoprim UNKNOWN

## 2025-01-24 LAB — HPV E6+E7 MRNA CVX QL NAA+PROBE: NEGATIVE

## 2025-02-03 ENCOUNTER — OFFICE VISIT (OUTPATIENT)
Facility: CLINIC | Age: 60
End: 2025-02-03
Payer: COMMERCIAL

## 2025-02-03 VITALS
HEART RATE: 69 BPM | SYSTOLIC BLOOD PRESSURE: 133 MMHG | DIASTOLIC BLOOD PRESSURE: 71 MMHG | HEIGHT: 67 IN | WEIGHT: 173 LBS | BODY MASS INDEX: 27.15 KG/M2

## 2025-02-03 DIAGNOSIS — K22.2 ESOPHAGEAL STRICTURE: ICD-10-CM

## 2025-02-03 DIAGNOSIS — K21.9 GASTROESOPHAGEAL REFLUX DISEASE WITHOUT ESOPHAGITIS: Primary | ICD-10-CM

## 2025-02-03 DIAGNOSIS — Z86.0100 HISTORY OF COLON POLYPS: ICD-10-CM

## 2025-02-03 PROCEDURE — 3075F SYST BP GE 130 - 139MM HG: CPT | Performed by: INTERNAL MEDICINE

## 2025-02-03 PROCEDURE — 3078F DIAST BP <80 MM HG: CPT | Performed by: INTERNAL MEDICINE

## 2025-02-03 PROCEDURE — 99213 OFFICE O/P EST LOW 20 MIN: CPT | Performed by: INTERNAL MEDICINE

## 2025-02-03 PROCEDURE — 3008F BODY MASS INDEX DOCD: CPT | Performed by: INTERNAL MEDICINE

## 2025-02-03 RX ORDER — OMEPRAZOLE 40 MG/1
40 CAPSULE, DELAYED RELEASE ORAL DAILY
Qty: 90 CAPSULE | Refills: 3 | Status: SHIPPED | OUTPATIENT
Start: 2025-02-03

## 2025-02-03 NOTE — PATIENT INSTRUCTIONS
1.  Continue omeprazole daily.  2.  You are due for a colonoscopy in June 2030.  3.  Follow-up office visit in 1 year.  4.  Please contact us sooner with any changes.

## 2025-02-03 NOTE — PROGRESS NOTES
Subjective:   Patient ID: Radha Billingsley is a 59 year old female.    HPI  The patient returns in follow-up.  She was last seen in endoscopy in June 2023.    As per previous notes the patient underwent upper and lower endoscopy in June 2023 for intermittent solid food dysphagia in the setting of frequent heartburn and rectal bleeding.  She was found to have an esophageal stricture (without eosinophilic esophagitis), a 5 cm hiatal hernia, H. pylori negative gastric fundic gland polyps, #2 diminutive sigmoid colon tubular adenomas and sigmoid colon diverticulosis with mucosal changes suggestive of segmental colitis associated with diverticulosis (SCAD) and internal hemorrhoids.  Conservative measures were advised along with a surveillance colonoscopy in 7 years (June 2030).    Current history:  The patient has been well since her last visit.  Her appetite is good.  Weight is stable within a few pounds.  She has had no recurrent dysphagia post dilatation and no heartburn.  She continues on 40 mg daily of omeprazole.  She denies abdominal pain.  Bowel movements are regular without recent change.  She may notice some rectal bleeding \"once in a blue, blue moon\".    Subjective wellbeing is excellent.      History/Other:   Review of Systems  See above    Wt Readings from Last 7 Encounters:   02/03/25 173 lb (78.5 kg)   01/23/25 175 lb 12.8 oz (79.7 kg)   09/30/24 176 lb (79.8 kg)   09/11/24 176 lb (79.8 kg)   08/02/24 178 lb (80.7 kg)   06/11/24 178 lb (80.7 kg)   10/30/23 182 lb (82.6 kg)       Current Outpatient Medications   Medication Sig Dispense Refill    cholecalciferol (D3-1000) 25 MCG (1000 UT) Oral Cap       Omeprazole 40 MG Oral Capsule Delayed Release Take 1 capsule (40 mg total) by mouth daily. 90 capsule 3    Calcium-Cholecalciferol (QC CALCIUM 500MG-D3) 500-5 MG-MCG Oral Tab       metoprolol succinate ER 50 MG Oral Tablet 24 Hr Take 1 tablet (50 mg total) by mouth daily. 90 tablet 3    clobetasol 0.05 %  External Ointment Use bid 60 g 1    zolpidem (AMBIEN) 5 MG Oral Tab Take 1 tablet (5 mg total) by mouth nightly as needed for Sleep. 30 tablet 5     Allergies:Allergies[1]    Objective:   Physical Exam  Vitals and nursing note reviewed.   Constitutional:       General: She is not in acute distress.     Appearance: She is well-developed. She is not ill-appearing, toxic-appearing or diaphoretic.   HENT:      Head: Normocephalic and atraumatic.      Mouth/Throat:      Pharynx: No oropharyngeal exudate.   Eyes:      General: No scleral icterus.     Conjunctiva/sclera: Conjunctivae normal.   Neck:      Thyroid: No thyromegaly.   Cardiovascular:      Rate and Rhythm: Normal rate and regular rhythm.      Heart sounds: Normal heart sounds.   Pulmonary:      Effort: Pulmonary effort is normal. No respiratory distress.      Breath sounds: Normal breath sounds. No wheezing or rales.   Abdominal:      General: Bowel sounds are normal. There is no distension.      Palpations: Abdomen is soft. There is no mass.      Tenderness: There is no abdominal tenderness. There is no guarding or rebound.   Musculoskeletal:      Cervical back: Neck supple.   Lymphadenopathy:      Cervical: No cervical adenopathy.   Neurological:      Mental Status: She is alert and oriented to person, place, and time.   Psychiatric:         Behavior: Behavior normal.       Component      Latest Ref [facility] 12/14/2024   WBC      4.0 - 11.0 x10(3) uL 4.9    RBC      3.80 - 5.30 x10(6)uL 4.87    Hemoglobin      12.0 - 16.0 g/dL 13.4    Hematocrit      35.0 - 48.0 % 40.3    MCV      80.0 - 100.0 fL 82.8    MCH      26.0 - 34.0 pg 27.5    MCHC      31.0 - 37.0 g/dL 33.3    RDW-SD      35.1 - 46.3 fL 41.0    RDW      11.0 - 15.0 % 13.6    Platelet Count      150.0 - 450.0 10(3)uL 297.0    Prelim Neutrophil Abs      1.50 - 7.70 x10 (3) uL 2.45    Neutrophils Absolute      1.50 - 7.70 x10(3) uL 2.45    Lymphocytes Absolute      1.00 - 4.00 x10(3) uL 1.65    Monocytes  Absolute      0.10 - 1.00 x10(3) uL 0.47    Eosinophils Absolute      0.00 - 0.70 x10(3) uL 0.24    Basophils Absolute      0.00 - 0.20 x10(3) uL 0.04    Immature Granulocyte Absolute      0.00 - 1.00 x10(3) uL 0.01    Neutrophils %      % 50.4    Lymphocytes %      % 34.0    Monocytes %      % 9.7    Eosinophils %      % 4.9    Basophils %      % 0.8    Immature Granulocyte %      % 0.2    Glucose      70 - 99 mg/dL 96    Sodium      136 - 145 mmol/L 143    Potassium      3.5 - 5.1 mmol/L 4.3    Chloride      98 - 112 mmol/L 110    Carbon Dioxide, Total      21.0 - 32.0 mmol/L 26.0    ANION GAP      0 - 18 mmol/L 7    BUN      9 - 23 mg/dL 19    CREATININE      0.55 - 1.02 mg/dL 0.80    BUN/CREATININE RATIO      10.0 - 20.0  23.8 (H)    CALCIUM      8.7 - 10.4 mg/dL 9.9    CALCULATED OSMOLALITY      275 - 295 mOsm/kg 298 (H)    EGFR      >=60 mL/min/1.73m2 84    ALT (SGPT)      10 - 49 U/L 29    AST (SGOT)      <34 U/L 23    ALKALINE PHOSPHATASE      50 - 130 U/L 92    Total Bilirubin      0.2 - 1.1 mg/dL 0.5    PROTEIN, TOTAL      5.7 - 8.2 g/dL 6.6    Albumin      3.2 - 4.8 g/dL 4.4    Globulin      2.0 - 3.5 g/dL 2.2    A/G Ratio      1.0 - 2.0  2.0    Patient Fasting for CMP? Yes       Legend:  (H) High    Assessment & Plan:   1. Gastroesophageal reflux disease without esophagitis  The patient has a history of complicated gastroesophageal reflux with a symptomatic peptic esophageal stricture.  She has responded to dilatation in June 2023 and is asymptomatic on PPI maintenance (40 mg daily of omeprazole).  I am recommending that the patient continue the omeprazole or equivalent indefinitely.  Refills for 1 year will be provided.  We discussed potential risks/complications of long-term PPI therapy.  She will contact us with recurrent symptomatology.  She will otherwise be seen in the office in 1 year.    2. Esophageal stricture  As above.    3. History of colon polyps  The patient is up-to-date with colonoscopic  surveillance.  She would be due for a colonoscopy in June 2030.        Meds This Visit:  Requested Prescriptions     Signed Prescriptions Disp Refills    Omeprazole 40 MG Oral Capsule Delayed Release 90 capsule 3     Sig: Take 1 capsule (40 mg total) by mouth daily.       Imaging & Referrals:  None         [1]   Allergies  Allergen Reactions    Amoxicillin-Pot Clavulanate OTHER (SEE COMMENTS)    Augmentin  [Amoclan] NAUSEA AND VOMITING    Sulfa Antibiotics NAUSEA AND VOMITING    Sulfamethoxazole NAUSEA AND VOMITING    Trimethoprim UNKNOWN    Trimethoprim UNKNOWN

## 2025-03-11 ENCOUNTER — HOSPITAL ENCOUNTER (OUTPATIENT)
Dept: MAMMOGRAPHY | Age: 60
Discharge: HOME | End: 2025-03-11
Attending: NURSE PRACTITIONER
Payer: COMMERCIAL

## 2025-03-11 DIAGNOSIS — Z12.31 SCREENING MAMMOGRAM FOR BREAST CANCER: ICD-10-CM

## 2025-03-11 PROCEDURE — 77067 SCR MAMMO BI INCL CAD: CPT | Performed by: NURSE PRACTITIONER

## 2025-03-11 PROCEDURE — 77063 BREAST TOMOSYNTHESIS BI: CPT | Performed by: NURSE PRACTITIONER

## 2025-05-23 ENCOUNTER — TELEPHONE (OUTPATIENT)
Facility: CLINIC | Age: 60
End: 2025-05-23

## 2025-05-23 RX ORDER — CIPROFLOXACIN 500 MG/1
500 TABLET, FILM COATED ORAL 2 TIMES DAILY
Qty: 20 TABLET | Refills: 0 | Status: SHIPPED | OUTPATIENT
Start: 2025-05-23

## 2025-05-23 NOTE — TELEPHONE ENCOUNTER
Dr Little    Called and spoke to Connor's (MA) mother.     The patient verified her name and date of birth.    She reported experiencing dull pain on the left lower abdomen radiating to the middle for a little over a week.    The first day she had pain, she took Motrin and applied heating pad to help with the discomfort. Pain score is about 1-2  out of 10 and also noticed a small amount of blood on the toilet paper, she was somewhat constipated at that time but stools are normal thereafter and no change in bowel habits.    Yesterday, she felt more \"gassy\" and bloated.    Denies fever/chills, nausea/vomiting. The patient is avoiding dairy products and foods with seeds.    She is concerned since she will be on vacation next week.    Hx SENDY.  CLN 6/22/2023  Last office visit:2/3/2025    Thank you

## 2025-05-23 NOTE — TELEPHONE ENCOUNTER
I spoke to the patient.  Symptoms as above.  Is difficult to ascertain whether the patient has low-grade diverticulitis or segmental colitis associated with diverticulosis (SCAD).  I am sending in a prescription for ciprofloxacin to be taken if the symptoms accelerate.  We have elected to hold off on metronidazole as the patient will be traveling and likely socially consuming alcohol.  If she requires antibiotic therapy she will contact me.

## 2025-05-23 NOTE — TELEPHONE ENCOUNTER
The patient called to speak with a nurse in regards to her diverticulosis. The patient states she is currently having a flair up and is asking if he can get some medication while she is on her vacation. Please call the patient first.

## 2025-05-27 ENCOUNTER — TELEPHONE (OUTPATIENT)
Facility: CLINIC | Age: 60
End: 2025-05-27

## 2025-05-27 RX ORDER — METRONIDAZOLE 500 MG/1
500 TABLET ORAL
Qty: 20 TABLET | Refills: 0 | Status: SHIPPED | OUTPATIENT
Start: 2025-05-27

## 2025-05-27 NOTE — TELEPHONE ENCOUNTER
Patient is having diverticulitis flare up and was prescribed ciprol. Patient is still having symptoms please call

## 2025-05-27 NOTE — TELEPHONE ENCOUNTER
Dr. Recio,    I spoke to patient, symptoms are still present/worsening. Taking Cipro since Saturday but no change. Pain is worsening. Little bit of diarrhea.     Denies fever. She is ok to take Metronidazole if you recommend it.     Please advise, thank you.

## 2025-05-27 NOTE — TELEPHONE ENCOUNTER
I spoke to Flower.  She feels that the symptoms are worsening, however, not severe.  No fever.  We discussed options of adding metronidazole or scheduling a CT scan.  We have elected to add metronidazole twice daily.  Avoidance of alcohol again discussed.  If her symptoms do not begin to improve within 1-2 days she will contact us and a CT scan of the abdomen and pelvis will be performed.  She will be going out of town this weekend.

## 2025-07-29 ENCOUNTER — OFFICE VISIT (OUTPATIENT)
Dept: FAMILY MEDICINE CLINIC | Facility: CLINIC | Age: 60
End: 2025-07-29
Payer: COMMERCIAL

## 2025-07-29 VITALS
OXYGEN SATURATION: 97 % | HEART RATE: 63 BPM | BODY MASS INDEX: 27.62 KG/M2 | TEMPERATURE: 99 F | DIASTOLIC BLOOD PRESSURE: 84 MMHG | WEIGHT: 176 LBS | SYSTOLIC BLOOD PRESSURE: 127 MMHG | HEIGHT: 67 IN | RESPIRATION RATE: 20 BRPM

## 2025-07-29 DIAGNOSIS — G47.9 SLEEP DISORDER: ICD-10-CM

## 2025-07-29 DIAGNOSIS — R13.19 ESOPHAGEAL DYSPHAGIA: ICD-10-CM

## 2025-07-29 DIAGNOSIS — I10 ESSENTIAL HYPERTENSION: ICD-10-CM

## 2025-07-29 DIAGNOSIS — Z00.00 ROUTINE GENERAL MEDICAL EXAMINATION AT A HEALTH CARE FACILITY: Primary | ICD-10-CM

## 2025-07-29 PROBLEM — K62.5 RECTAL BLEEDING: Status: RESOLVED | Noted: 2022-04-25 | Resolved: 2025-07-29

## 2025-07-29 RX ORDER — ZOLPIDEM TARTRATE 5 MG/1
5 TABLET ORAL NIGHTLY PRN
Qty: 30 TABLET | Refills: 5 | Status: SHIPPED | OUTPATIENT
Start: 2025-07-29

## 2025-08-01 RX ORDER — OMEPRAZOLE 40 MG/1
40 CAPSULE, DELAYED RELEASE ORAL DAILY
Qty: 90 CAPSULE | Refills: 3 | OUTPATIENT
Start: 2025-08-01

## 2025-08-27 ENCOUNTER — HOSPITAL ENCOUNTER (OUTPATIENT)
Dept: CT IMAGING | Age: 60
Discharge: HOME | End: 2025-08-27
Attending: FAMILY MEDICINE

## 2025-08-27 DIAGNOSIS — I10 ESSENTIAL HYPERTENSION: ICD-10-CM

## (undated) DEVICE — Device: Brand: DUAL NARE NASAL CANNULAE FEMALE LUER CON 7FT O2 TUBE

## (undated) DEVICE — FORCEP RADIAL JAW 4

## (undated) DEVICE — SYRINGE/GUAGE ASSEMBLY

## (undated) DEVICE — MEDI-VAC NON-CONDUCTIVE SUCTION TUBING: Brand: CARDINAL HEALTH

## (undated) DEVICE — CATH BALLOON CRE 15-18MM 5837

## (undated) DEVICE — BITE BLOCK, 16MM SCOPE PORT, PEDIATRIC, WITH STRAP: Brand: KEY SURGICAL BITE BLOCKS

## (undated) DEVICE — 60 ML SYRINGE REGULAR TIP: Brand: MONOJECT

## (undated) DEVICE — POLYP TRAP ACUTRAP 4 CHAMBER

## (undated) DEVICE — YANKAUER SUCTION INSTRUMENT NO CONTROL VENT, BULB TIP, CLEAR: Brand: YANKAUER

## (undated) DEVICE — KIT CLEAN ENDOKIT 1.1OZ GOWNX2

## (undated) DEVICE — KIT ENDO ORCAPOD 160/180/190

## (undated) DEVICE — SNARE CAPTIFLEX MICRO-OVL OLY

## (undated) DEVICE — MEDI-VAC NON-CONDUCTIVE SUCTION TUBING 6MM X 1.8M (6FT.) L: Brand: CARDINAL HEALTH

## (undated) NOTE — MR AVS SNAPSHOT
[facility] - [place] [address] [address] [id number] [phone]               Thank you for choosing us for your health care visit with Jennifer Morales DO.   We are glad to serve you and happy to provide you with this summary of - [address] [facility], [phone], [phone]  [address], [facility] [address]    Hours:  24-hours Phone:  [phone]    - Amitriptyline HCl 50 MG Tabs  - AmLODIPine Besylate 5 MG Tabs  - Metoprolol Succi

## (undated) NOTE — ED AVS SNAPSHOT
Niyah Murray   MRN: [id number]    Department:  [facility] Emergency Department   Date of Visit:  3/6/2020           Disclosure     Insurance plans vary and the physician(s) referred by the ER may not be covered by your plan.  Please contact you CARE PHYSICIAN AT ONCE OR RETURN IMMEDIATELY TO THE EMERGENCY DEPARTMENT. If you have been prescribed any medication(s), please fill your prescription right away and begin taking the medication(s) as directed.   If you believe that any of the medications

## (undated) NOTE — LETTER
[address] [facility] [address] [place], [place]  Authorization for Surgical Operation and Procedure                                                                                           I hereby authorize Moncho Ruiz MD, my physician and his/her assistants (if applicable), which may include medical students, residents, and/or fellows, to perform the following surgical operation/ procedure and administer such anesthesia as may be determined necessary by my physician: Operation/Procedure name (s) COLONOSCOPY / ESOPHAGOGASTRODUODENOSCOPY on [place]   2. I recognize that during the surgical operation/procedure, unforeseen conditions may necessitate additional or different procedures than those listed above. I, therefore, further authorize and request that the above-named surgeon, assistants, or designees perform such procedures as are, in their judgment, necessary and desirable. 3.   My surgeon/physician has discussed prior to my surgery the potential benefits, risks and side effects of this procedure; the likelihood of achieving goals; and potential problems that might occur during recuperation. They also discussed reasonable alternatives to the procedure, including risks, benefits, and side effects related to the alternatives and risks related to not receiving this procedure. I have had all my questions answered and I acknowledge that no guarantee has been made as to the result that may be obtained. 4.   Should the need arise during my operation/procedure, which includes change of level of care prior to discharge, I also consent to the administration of blood and/or blood products. Further, I understand that despite careful testing and screening of blood or blood products by collecting agencies, I may still be subject to ill effects as a result of receiving a blood transfusion and/or blood products.   The following are some, but not all, of the potential risks that can occur: fever and allergic reactions, hemolytic reactions, transmission of diseases such as Hepatitis, AIDS and Cytomegalovirus (CMV) and fluid overload. In the event that I wish to have an autologous transfusion of my own blood, or a directed donor transfusion, I will discuss this with my physician. Check only if Refusing Blood or Blood Products  I understand refusal of blood or blood products as deemed necessary by my physician may have serious consequences to my condition to include possible death. I hereby assume responsibility for my refusal and release the hospital, its personnel, and my physicians from any responsibility for the consequences of my refusal.    o  Refuse   5. I authorize the use of any specimen, organs, tissues, body parts or foreign objects that may be removed from my body during the operation/procedure for diagnosis, research or teaching purposes and their subsequent disposal by hospital authorities. I also authorize the release of specimen test results and/or written reports to my treating physician on the hospital medical staff or other referring or consulting physicians involved in my care, at the discretion of the Pathologist or my treating physician. 6.   I consent to the photographing or videotaping of the operations or procedures to be performed, including appropriate portions of my body for medical, scientific, or educational purposes, provided my identity is not revealed by the pictures or by descriptive texts accompanying them. If the procedure has been photographed/videotaped, the surgeon will obtain the original picture, image, videotape or CD. The hospital will not be responsible for storage, release or maintenance of the picture, image, tape or CD.    7.   I consent to the presence of a  or observers in the operating room as deemed necessary by my physician or their designees.     8.   I recognize that in the event my procedure results in extended X-Ray/fluoroscopy time, I may develop a skin reaction. 9. If I have a Do Not Attempt Resuscitation (DNAR) order in place, that status will be suspended while in the operating room, procedural suite, and during the recovery period unless otherwise explicitly stated by me (or a person authorized to consent on my behalf). The surgeon or my attending physician will determine when the applicable recovery period ends for purposes of reinstating the DNAR order. 10. Patients having a sterilization procedure: I understand that if the procedure is successful the results will be permanent and it will therefore be impossible for me to inseminate, conceive, or bear children. I also understand that the procedure is intended to result in sterility, although the result has not been guaranteed. 11. I acknowledge that my physician has explained sedation/analgesia administration to me including the risk and benefits I consent to the administration of sedation/analgesia as may be necessary or desirable in the judgment of my physician. I CERTIFY THAT I HAVE READ AND FULLY UNDERSTAND THE ABOVE CONSENT TO OPERATION and/or OTHER PROCEDURE.     _________________________________________ _________________________________     ___________________________________  Signature of Patient     Signature of Responsible Person                   Printed Name of Responsible Person                              _________________________________________ ______________________________        ___________________________________  Signature of Witness         Date  Time         Relationship to Patient    STATEMENT OF PHYSICIAN My signature below affirms that prior to the time of the procedure; I have explained to the patient and/or his/her legal representative, the risks and benefits involved in the proposed treatment and any reasonable alternative to the proposed treatment.  I have also explained the risks and benefits involved in refusal of the proposed treatment and alternatives to the proposed treatment and have answered the patient's questions.  If I have a significant financial interest in a co-management agreement or a significant financial interest in any product or implant, or other significant relationship used in this procedure/surgery, I have disclosed this and had a discussion with my patient.     _______________________________________________________________ _____________________________  Satish Medina  )                                                                                         (Date)                                   (Time)  Patient Name: Betsy Harris    : 3/21/1965   Printed: 2023      Medical Record #: [id number]                                              Page 1 of 1

## (undated) NOTE — LETTER
05/09/18        [address] [facility]      Dear Diana Solano records indicate that you have outstanding lab work and or testing that was ordered for you and has not yet been completed:          Comp Metabolic Panel (14)

## (undated) NOTE — MR AVS SNAPSHOT
[address]  [phone]               Thank you for choosing us for your health care visit with Flor Bailey.  Joya Jackson MD.  We are glad to serve you and happy to provide you with this summar Take  by mouth.                 Where to Get Your Medications      These medications were sent to [address], [address] [facility]  [address] [facility], [phone], [address], [address] [facility] HOW TO GET STARTED: HOW TO STAY MOTIVATED:   Start activities slowly and build up over time Do what you like   Get your heart pumping – brisk walking, biking, swimming Even 10 minute increments are effective and add up over the week   2 ½ hours per week –

## (undated) NOTE — MR AVS SNAPSHOT
[address] [facility]  [phone]               Thank you for choosing us for your health care visit with Andres Marshall.  Adan Vides MD.  We are glad to serve you and happy to provide you with this summar If you've recently had a stay at the Hospital you can access your discharge instructions in [employer] by going to Visits < Admission Summaries.  If you've been to the Emergency Department or your doctor's office, you can view your past visit information in My

## (undated) NOTE — LETTER
07/09/18        [address] [facility]      Dear Rose Mary Lambert,    [address] [facility] records indicate that you have outstanding lab work and or testing that was ordered for you and has not yet been completed:          Comp Metabolic Panel (14)